# Patient Record
Sex: FEMALE | Employment: OTHER | URBAN - METROPOLITAN AREA
[De-identification: names, ages, dates, MRNs, and addresses within clinical notes are randomized per-mention and may not be internally consistent; named-entity substitution may affect disease eponyms.]

---

## 2018-04-05 ENCOUNTER — APPOINTMENT (OUTPATIENT)
Dept: GENERAL RADIOLOGY | Age: 73
DRG: 189 | End: 2018-04-05
Attending: EMERGENCY MEDICINE
Payer: MEDICARE

## 2018-04-05 ENCOUNTER — HOSPITAL ENCOUNTER (INPATIENT)
Age: 73
LOS: 2 days | Discharge: HOME | DRG: 189 | End: 2018-04-07
Attending: EMERGENCY MEDICINE | Admitting: INTERNAL MEDICINE
Payer: MEDICARE

## 2018-04-05 ENCOUNTER — APPOINTMENT (OUTPATIENT)
Dept: CT IMAGING | Age: 73
DRG: 189 | End: 2018-04-05
Attending: INTERNAL MEDICINE
Payer: MEDICARE

## 2018-04-05 ENCOUNTER — APPOINTMENT (OUTPATIENT)
Dept: ULTRASOUND IMAGING | Age: 73
DRG: 189 | End: 2018-04-05
Attending: INTERNAL MEDICINE
Payer: MEDICARE

## 2018-04-05 ENCOUNTER — APPOINTMENT (OUTPATIENT)
Dept: GENERAL RADIOLOGY | Age: 73
DRG: 189 | End: 2018-04-05
Attending: RADIOLOGY
Payer: MEDICARE

## 2018-04-05 DIAGNOSIS — D63.8 ANEMIA OF CHRONIC DISEASE: ICD-10-CM

## 2018-04-05 DIAGNOSIS — J96.01 ACUTE RESPIRATORY FAILURE WITH HYPOXIA (HCC): Primary | ICD-10-CM

## 2018-04-05 DIAGNOSIS — J98.11 COMPRESSION ATELECTASIS: ICD-10-CM

## 2018-04-05 DIAGNOSIS — J18.9 COMMUNITY ACQUIRED PNEUMONIA OF LEFT LUNG, UNSPECIFIED PART OF LUNG: ICD-10-CM

## 2018-04-05 DIAGNOSIS — N17.9 AKI (ACUTE KIDNEY INJURY) (HCC): ICD-10-CM

## 2018-04-05 DIAGNOSIS — J90 PLEURAL EFFUSION, LEFT: ICD-10-CM

## 2018-04-05 DIAGNOSIS — D64.9 SYMPTOMATIC ANEMIA: ICD-10-CM

## 2018-04-05 DIAGNOSIS — R91.8 MASS OF LEFT LUNG: ICD-10-CM

## 2018-04-05 LAB
ABO + RH BLD: NORMAL
ALBUMIN FLD-MCNC: 2.1 G/DL
ALBUMIN SERPL-MCNC: 2.8 G/DL (ref 3.5–5)
ALBUMIN/GLOB SERPL: 0.7 {RATIO} (ref 1.1–2.2)
ALP SERPL-CCNC: 89 U/L (ref 45–117)
ALT SERPL-CCNC: 16 U/L (ref 12–78)
AMORPH CRY URNS QL MICRO: ABNORMAL
ANION GAP SERPL CALC-SCNC: 12 MMOL/L (ref 5–15)
APPEARANCE FLD: ABNORMAL
APPEARANCE UR: ABNORMAL
APTT PPP: 27.8 SEC (ref 22.1–32)
AST SERPL-CCNC: 16 U/L (ref 15–37)
ATRIAL RATE: 70 BPM
BACTERIA URNS QL MICRO: NEGATIVE /HPF
BASOPHILS # BLD: 0.1 K/UL (ref 0–0.1)
BASOPHILS NFR BLD: 1 % (ref 0–1)
BILIRUB SERPL-MCNC: 0.2 MG/DL (ref 0.2–1)
BILIRUB UR QL: NEGATIVE
BLOOD GROUP ANTIBODIES SERPL: NORMAL
BNP SERPL-MCNC: 8324 PG/ML (ref 0–125)
BODY FLD TYPE: NORMAL
BUN SERPL-MCNC: 95 MG/DL (ref 6–20)
BUN/CREAT SERPL: 12 (ref 12–20)
CALCIUM SERPL-MCNC: 11.6 MG/DL (ref 8.5–10.1)
CALCULATED P AXIS, ECG09: 65 DEGREES
CALCULATED R AXIS, ECG10: 45 DEGREES
CALCULATED T AXIS, ECG11: 35 DEGREES
CHLORIDE SERPL-SCNC: 104 MMOL/L (ref 97–108)
CK MB CFR SERPL CALC: 7.4 % (ref 0–2.5)
CK MB SERPL-MCNC: 5.9 NG/ML (ref 5–25)
CK SERPL-CCNC: 80 U/L (ref 26–192)
CO2 SERPL-SCNC: 23 MMOL/L (ref 21–32)
COLOR FLD: COLORLESS
COLOR UR: ABNORMAL
CREAT SERPL-MCNC: 7.83 MG/DL (ref 0.55–1.02)
CREAT UR-MCNC: 54.5 MG/DL
DIAGNOSIS, 93000: NORMAL
DIFFERENTIAL METHOD BLD: ABNORMAL
EOSINOPHIL # BLD: 0.3 K/UL (ref 0–0.4)
EOSINOPHIL NFR BLD: 3 % (ref 0–7)
EOSINOPHIL NFR FLD MANUAL: 2 %
EPITH CASTS URNS QL MICRO: ABNORMAL /LPF
ERYTHROCYTE [DISTWIDTH] IN BLOOD BY AUTOMATED COUNT: 14.9 % (ref 11.5–14.5)
FLUAV AG NPH QL IA: NEGATIVE
FLUBV AG NOSE QL IA: NEGATIVE
GLOBULIN SER CALC-MCNC: 4.3 G/DL (ref 2–4)
GLUCOSE FLD-MCNC: 100 MG/DL
GLUCOSE SERPL-MCNC: 136 MG/DL (ref 65–100)
GLUCOSE UR STRIP.AUTO-MCNC: NEGATIVE MG/DL
HCT VFR BLD AUTO: 27 % (ref 35–47)
HEMOCCULT STL QL: NEGATIVE
HGB BLD-MCNC: 8.6 G/DL (ref 11.5–16)
HGB UR QL STRIP: ABNORMAL
IMM GRANULOCYTES # BLD: 0.1 K/UL (ref 0–0.04)
IMM GRANULOCYTES NFR BLD AUTO: 1 % (ref 0–0.5)
INR PPP: 1.1 (ref 0.9–1.1)
KETONES UR QL STRIP.AUTO: NEGATIVE MG/DL
LACTATE SERPL-SCNC: 1.4 MMOL/L (ref 0.4–2)
LDH FLD L TO P-CCNC: 232 U/L
LEUKOCYTE ESTERASE UR QL STRIP.AUTO: NEGATIVE
LYMPHOCYTES # BLD: 1.4 K/UL (ref 0.8–3.5)
LYMPHOCYTES NFR BLD: 17 % (ref 12–49)
LYMPHOCYTES NFR FLD: 12 %
MAGNESIUM SERPL-MCNC: 2.5 MG/DL (ref 1.6–2.4)
MCH RBC QN AUTO: 26.9 PG (ref 26–34)
MCHC RBC AUTO-ENTMCNC: 31.9 G/DL (ref 30–36.5)
MCV RBC AUTO: 84.4 FL (ref 80–99)
MONOCYTES # BLD: 1.1 K/UL (ref 0–1)
MONOCYTES NFR BLD: 13 % (ref 5–13)
MONOS+MACROS NFR FLD: 2 %
NEUTROPHILS NFR FLD: 20 %
NEUTS SEG # BLD: 5.5 K/UL (ref 1.8–8)
NEUTS SEG NFR BLD: 66 % (ref 32–75)
NITRITE UR QL STRIP.AUTO: NEGATIVE
NRBC # BLD: 0 K/UL (ref 0–0.01)
NRBC BLD-RTO: 0 PER 100 WBC
NUC CELL # FLD: 2219 /CU MM
OTHER CELL,FOTHC: 64 %
P-R INTERVAL, ECG05: 158 MS
PATH REV BLD -IMP: NORMAL
PH FLD: 7 [PH]
PH UR STRIP: 5.5 [PH] (ref 5–8)
PLATELET # BLD AUTO: 306 K/UL (ref 150–400)
PMV BLD AUTO: 10.6 FL (ref 8.9–12.9)
POTASSIUM SERPL-SCNC: 3.2 MMOL/L (ref 3.5–5.1)
PROT FLD-MCNC: 4.4 G/DL
PROT SERPL-MCNC: 7.1 G/DL (ref 6.4–8.2)
PROT UR STRIP-MCNC: 100 MG/DL
PROTHROMBIN TIME: 11.3 SEC (ref 9–11.1)
Q-T INTERVAL, ECG07: 388 MS
QRS DURATION, ECG06: 90 MS
QTC CALCULATION (BEZET), ECG08: 419 MS
RBC # BLD AUTO: 3.2 M/UL (ref 3.8–5.2)
RBC # FLD: >100 /CU MM
RBC #/AREA URNS HPF: ABNORMAL /HPF (ref 0–5)
SODIUM SERPL-SCNC: 139 MMOL/L (ref 136–145)
SP GR UR REFRACTOMETRY: 1.01 (ref 1–1.03)
SPECIMEN EXP DATE BLD: NORMAL
SPECIMEN SOURCE FLD: ABNORMAL
SPECIMEN SOURCE FLD: NORMAL
THERAPEUTIC RANGE,PTTT: NORMAL SECS (ref 58–77)
TRIGL FLD-MCNC: 48 MG/DL
TROPONIN I SERPL-MCNC: <0.04 NG/ML
UA: UC IF INDICATED,UAUC: ABNORMAL
UROBILINOGEN UR QL STRIP.AUTO: 0.2 EU/DL (ref 0.2–1)
VENTRICULAR RATE, ECG03: 70 BPM
WBC # BLD AUTO: 8.4 K/UL (ref 3.6–11)
WBC URNS QL MICRO: ABNORMAL /HPF (ref 0–4)

## 2018-04-05 PROCEDURE — 87040 BLOOD CULTURE FOR BACTERIA: CPT | Performed by: EMERGENCY MEDICINE

## 2018-04-05 PROCEDURE — 88342 IMHCHEM/IMCYTCHM 1ST ANTB: CPT | Performed by: INTERNAL MEDICINE

## 2018-04-05 PROCEDURE — 93005 ELECTROCARDIOGRAM TRACING: CPT

## 2018-04-05 PROCEDURE — 89050 BODY FLUID CELL COUNT: CPT | Performed by: INTERNAL MEDICINE

## 2018-04-05 PROCEDURE — 84157 ASSAY OF PROTEIN OTHER: CPT | Performed by: INTERNAL MEDICINE

## 2018-04-05 PROCEDURE — 81001 URINALYSIS AUTO W/SCOPE: CPT | Performed by: EMERGENCY MEDICINE

## 2018-04-05 PROCEDURE — 85730 THROMBOPLASTIN TIME PARTIAL: CPT | Performed by: EMERGENCY MEDICINE

## 2018-04-05 PROCEDURE — 65660000000 HC RM CCU STEPDOWN

## 2018-04-05 PROCEDURE — 76770 US EXAM ABDO BACK WALL COMP: CPT

## 2018-04-05 PROCEDURE — 74011250637 HC RX REV CODE- 250/637: Performed by: INTERNAL MEDICINE

## 2018-04-05 PROCEDURE — 99285 EMERGENCY DEPT VISIT HI MDM: CPT

## 2018-04-05 PROCEDURE — 77030029684 HC NEB SM VOL KT MONA -A

## 2018-04-05 PROCEDURE — 87205 SMEAR GRAM STAIN: CPT | Performed by: INTERNAL MEDICINE

## 2018-04-05 PROCEDURE — 87804 INFLUENZA ASSAY W/OPTIC: CPT | Performed by: EMERGENCY MEDICINE

## 2018-04-05 PROCEDURE — 83880 ASSAY OF NATRIURETIC PEPTIDE: CPT | Performed by: EMERGENCY MEDICINE

## 2018-04-05 PROCEDURE — 88112 CYTOPATH CELL ENHANCE TECH: CPT | Performed by: INTERNAL MEDICINE

## 2018-04-05 PROCEDURE — 88185 FLOWCYTOMETRY/TC ADD-ON: CPT | Performed by: INTERNAL MEDICINE

## 2018-04-05 PROCEDURE — 74011250636 HC RX REV CODE- 250/636: Performed by: EMERGENCY MEDICINE

## 2018-04-05 PROCEDURE — 88341 IMHCHEM/IMCYTCHM EA ADD ANTB: CPT | Performed by: INTERNAL MEDICINE

## 2018-04-05 PROCEDURE — 96375 TX/PRO/DX INJ NEW DRUG ADDON: CPT

## 2018-04-05 PROCEDURE — 96365 THER/PROPH/DIAG IV INF INIT: CPT

## 2018-04-05 PROCEDURE — 85610 PROTHROMBIN TIME: CPT | Performed by: EMERGENCY MEDICINE

## 2018-04-05 PROCEDURE — 71045 X-RAY EXAM CHEST 1 VIEW: CPT

## 2018-04-05 PROCEDURE — 74011250636 HC RX REV CODE- 250/636: Performed by: INTERNAL MEDICINE

## 2018-04-05 PROCEDURE — 71250 CT THORAX DX C-: CPT

## 2018-04-05 PROCEDURE — 83615 LACTATE (LD) (LDH) ENZYME: CPT | Performed by: INTERNAL MEDICINE

## 2018-04-05 PROCEDURE — 83605 ASSAY OF LACTIC ACID: CPT | Performed by: EMERGENCY MEDICINE

## 2018-04-05 PROCEDURE — 83986 ASSAY PH BODY FLUID NOS: CPT | Performed by: INTERNAL MEDICINE

## 2018-04-05 PROCEDURE — 82550 ASSAY OF CK (CPK): CPT | Performed by: EMERGENCY MEDICINE

## 2018-04-05 PROCEDURE — 80053 COMPREHEN METABOLIC PANEL: CPT | Performed by: EMERGENCY MEDICINE

## 2018-04-05 PROCEDURE — 82272 OCCULT BLD FECES 1-3 TESTS: CPT | Performed by: EMERGENCY MEDICINE

## 2018-04-05 PROCEDURE — 82945 GLUCOSE OTHER FLUID: CPT | Performed by: INTERNAL MEDICINE

## 2018-04-05 PROCEDURE — 36415 COLL VENOUS BLD VENIPUNCTURE: CPT | Performed by: EMERGENCY MEDICINE

## 2018-04-05 PROCEDURE — G8978 MOBILITY CURRENT STATUS: HCPCS

## 2018-04-05 PROCEDURE — 88305 TISSUE EXAM BY PATHOLOGIST: CPT | Performed by: INTERNAL MEDICINE

## 2018-04-05 PROCEDURE — 94761 N-INVAS EAR/PLS OXIMETRY MLT: CPT

## 2018-04-05 PROCEDURE — 74011000258 HC RX REV CODE- 258: Performed by: EMERGENCY MEDICINE

## 2018-04-05 PROCEDURE — 85025 COMPLETE CBC W/AUTO DIFF WBC: CPT | Performed by: EMERGENCY MEDICINE

## 2018-04-05 PROCEDURE — 86900 BLOOD TYPING SEROLOGIC ABO: CPT | Performed by: EMERGENCY MEDICINE

## 2018-04-05 PROCEDURE — 84484 ASSAY OF TROPONIN QUANT: CPT | Performed by: EMERGENCY MEDICINE

## 2018-04-05 PROCEDURE — 74011000250 HC RX REV CODE- 250: Performed by: EMERGENCY MEDICINE

## 2018-04-05 PROCEDURE — 97530 THERAPEUTIC ACTIVITIES: CPT

## 2018-04-05 PROCEDURE — G8979 MOBILITY GOAL STATUS: HCPCS

## 2018-04-05 PROCEDURE — 0W9B3ZX DRAINAGE OF LEFT PLEURAL CAVITY, PERCUTANEOUS APPROACH, DIAGNOSTIC: ICD-10-PCS | Performed by: RADIOLOGY

## 2018-04-05 PROCEDURE — 82570 ASSAY OF URINE CREATININE: CPT | Performed by: INTERNAL MEDICINE

## 2018-04-05 PROCEDURE — 74011000250 HC RX REV CODE- 250: Performed by: RADIOLOGY

## 2018-04-05 PROCEDURE — 83735 ASSAY OF MAGNESIUM: CPT | Performed by: EMERGENCY MEDICINE

## 2018-04-05 PROCEDURE — 84478 ASSAY OF TRIGLYCERIDES: CPT | Performed by: INTERNAL MEDICINE

## 2018-04-05 PROCEDURE — 88184 FLOWCYTOMETRY/ TC 1 MARKER: CPT | Performed by: INTERNAL MEDICINE

## 2018-04-05 PROCEDURE — 32555 ASPIRATE PLEURA W/ IMAGING: CPT

## 2018-04-05 PROCEDURE — 97161 PT EVAL LOW COMPLEX 20 MIN: CPT

## 2018-04-05 PROCEDURE — 96367 TX/PROPH/DG ADDL SEQ IV INF: CPT

## 2018-04-05 PROCEDURE — 94640 AIRWAY INHALATION TREATMENT: CPT

## 2018-04-05 PROCEDURE — 82042 OTHER SOURCE ALBUMIN QUAN EA: CPT | Performed by: INTERNAL MEDICINE

## 2018-04-05 RX ORDER — ANASTROZOLE 1 MG/1
1 TABLET ORAL DAILY
Status: DISCONTINUED | OUTPATIENT
Start: 2018-04-05 | End: 2018-04-07 | Stop reason: HOSPADM

## 2018-04-05 RX ORDER — SODIUM CHLORIDE 0.9 % (FLUSH) 0.9 %
5-10 SYRINGE (ML) INJECTION EVERY 8 HOURS
Status: DISCONTINUED | OUTPATIENT
Start: 2018-04-05 | End: 2018-04-07 | Stop reason: HOSPADM

## 2018-04-05 RX ORDER — IPRATROPIUM BROMIDE AND ALBUTEROL SULFATE 2.5; .5 MG/3ML; MG/3ML
3 SOLUTION RESPIRATORY (INHALATION)
Status: COMPLETED | OUTPATIENT
Start: 2018-04-05 | End: 2018-04-05

## 2018-04-05 RX ORDER — FACIAL-BODY WIPES
10 EACH TOPICAL DAILY PRN
Status: DISCONTINUED | OUTPATIENT
Start: 2018-04-05 | End: 2018-04-07 | Stop reason: HOSPADM

## 2018-04-05 RX ORDER — SODIUM CHLORIDE 0.9 % (FLUSH) 0.9 %
5-10 SYRINGE (ML) INJECTION AS NEEDED
Status: DISCONTINUED | OUTPATIENT
Start: 2018-04-05 | End: 2018-04-07 | Stop reason: HOSPADM

## 2018-04-05 RX ORDER — HYDRALAZINE HYDROCHLORIDE 20 MG/ML
20 INJECTION INTRAMUSCULAR; INTRAVENOUS
Status: DISCONTINUED | OUTPATIENT
Start: 2018-04-05 | End: 2018-04-07 | Stop reason: HOSPADM

## 2018-04-05 RX ORDER — OXYCODONE AND ACETAMINOPHEN 5; 325 MG/1; MG/1
1 TABLET ORAL
Status: DISCONTINUED | OUTPATIENT
Start: 2018-04-05 | End: 2018-04-07 | Stop reason: HOSPADM

## 2018-04-05 RX ORDER — ACETAMINOPHEN 325 MG/1
650 TABLET ORAL
Status: DISCONTINUED | OUTPATIENT
Start: 2018-04-05 | End: 2018-04-07 | Stop reason: HOSPADM

## 2018-04-05 RX ORDER — ENOXAPARIN SODIUM 100 MG/ML
40 INJECTION SUBCUTANEOUS EVERY 24 HOURS
Status: DISCONTINUED | OUTPATIENT
Start: 2018-04-05 | End: 2018-04-05

## 2018-04-05 RX ORDER — LIDOCAINE HYDROCHLORIDE 10 MG/ML
10 INJECTION INFILTRATION; PERINEURAL
Status: COMPLETED | OUTPATIENT
Start: 2018-04-05 | End: 2018-04-05

## 2018-04-05 RX ORDER — SODIUM CHLORIDE 9 MG/ML
75 INJECTION, SOLUTION INTRAVENOUS CONTINUOUS
Status: DISCONTINUED | OUTPATIENT
Start: 2018-04-05 | End: 2018-04-07 | Stop reason: HOSPADM

## 2018-04-05 RX ORDER — HEPARIN SODIUM 5000 [USP'U]/ML
5000 INJECTION, SOLUTION INTRAVENOUS; SUBCUTANEOUS EVERY 12 HOURS
Status: DISCONTINUED | OUTPATIENT
Start: 2018-04-05 | End: 2018-04-07

## 2018-04-05 RX ORDER — NALOXONE HYDROCHLORIDE 0.4 MG/ML
0.4 INJECTION, SOLUTION INTRAMUSCULAR; INTRAVENOUS; SUBCUTANEOUS AS NEEDED
Status: DISCONTINUED | OUTPATIENT
Start: 2018-04-05 | End: 2018-04-07 | Stop reason: HOSPADM

## 2018-04-05 RX ORDER — POTASSIUM CHLORIDE 750 MG/1
20 TABLET, FILM COATED, EXTENDED RELEASE ORAL
Status: COMPLETED | OUTPATIENT
Start: 2018-04-05 | End: 2018-04-05

## 2018-04-05 RX ORDER — ONDANSETRON 2 MG/ML
4 INJECTION INTRAMUSCULAR; INTRAVENOUS
Status: DISCONTINUED | OUTPATIENT
Start: 2018-04-05 | End: 2018-04-07 | Stop reason: HOSPADM

## 2018-04-05 RX ADMIN — CEFTRIAXONE SODIUM 2 G: 2 INJECTION, POWDER, FOR SOLUTION INTRAMUSCULAR; INTRAVENOUS at 02:41

## 2018-04-05 RX ADMIN — FAMOTIDINE 20 MG: 10 INJECTION INTRAVENOUS at 03:14

## 2018-04-05 RX ADMIN — Medication 10 ML: at 21:15

## 2018-04-05 RX ADMIN — SODIUM CHLORIDE 75 ML/HR: 900 INJECTION, SOLUTION INTRAVENOUS at 06:45

## 2018-04-05 RX ADMIN — AZITHROMYCIN MONOHYDRATE 500 MG: 500 INJECTION, POWDER, LYOPHILIZED, FOR SOLUTION INTRAVENOUS at 03:15

## 2018-04-05 RX ADMIN — ANASTROZOLE 1 MG: 1 TABLET, COATED ORAL at 10:00

## 2018-04-05 RX ADMIN — Medication 10 ML: at 17:57

## 2018-04-05 RX ADMIN — HEPARIN SODIUM 5000 UNITS: 5000 INJECTION, SOLUTION INTRAVENOUS; SUBCUTANEOUS at 17:53

## 2018-04-05 RX ADMIN — Medication 10 ML: at 06:46

## 2018-04-05 RX ADMIN — POTASSIUM CHLORIDE 20 MEQ: 750 TABLET, EXTENDED RELEASE ORAL at 09:41

## 2018-04-05 RX ADMIN — Medication 10 ML: at 02:14

## 2018-04-05 RX ADMIN — LIDOCAINE HYDROCHLORIDE 10 ML: 10 INJECTION, SOLUTION INFILTRATION; PERINEURAL at 12:00

## 2018-04-05 RX ADMIN — IPRATROPIUM BROMIDE AND ALBUTEROL SULFATE 3 ML: .5; 3 SOLUTION RESPIRATORY (INHALATION) at 02:14

## 2018-04-05 RX ADMIN — Medication 10 ML: at 21:14

## 2018-04-05 NOTE — ED PROVIDER NOTES
EMERGENCY DEPARTMENT HISTORY AND PHYSICAL EXAM      Date: 4/5/2018  Patient Name: Colette Santiago    History of Presenting Illness     Chief Complaint   Patient presents with    Shortness of Breath     Per EMS SOB x2 weeks. Denies home oxygen. Ambulatory from EMS to ED stretcher.  Melena     Dark colored stool per EMS x2 weeks. History Provided By: Patient, Patient's  and EMS    HPI: Colette Santiago, 68 y.o. female with PMHx significant for HTN / breast CA, presents via EMS accompanied by  to the ED with cc of persistent SOB x 6 weeks that is exacerbated with activity/ambulation and has become progressively worse over the last 2 weeks. Pt complains of associated mild bilateral leg swelling, persistent dry non-productive cough x 3 weeks, gradually worsening fatigue, and persistent dark colored stool x 2 weeks. She denies any history of similar symptoms. Pt denies taking any blood thinning medication. She notes that she has had a colonoscopy within the last couple years that was normal. Pt endorses taking prescription Advil over the last couple weeks for a pulled muscle in her buttocks. She specifically denies fever, chills, or CP. PCP: Sergio Blum MD    There are no other complaints, changes, or physical findings at this time.     Current Facility-Administered Medications   Medication Dose Route Frequency Provider Last Rate Last Dose    sodium chloride (NS) flush 5-10 mL  5-10 mL IntraVENous Q8H Anita Salas MD   10 mL at 04/05/18 0214    sodium chloride (NS) flush 5-10 mL  5-10 mL IntraVENous PRN Anita Salas MD        cefTRIAXone (ROCEPHIN) 2 g in 0.9% sodium chloride (MBP/ADV) 50 mL  2 g IntraVENous Q24H Anita Salas MD   Stopped at 04/05/18 0311    azithromycin (ZITHROMAX) 500 mg in 0.9% sodium chloride (MBP/ADV) 250 mL  500 mg IntraVENous Q24H Anita Salas MD   Stopped at 04/05/18 0131       Past History     Past Medical History:  Past Medical History:   Diagnosis Date    Breast cancer ([facility] [address]     Hypertension        Past Surgical History:  Past Surgical History:   Procedure Laterality Date    BREAST SURGERY PROCEDURE UNLISTED      HX GYN       x3    HX LYMPHADENECTOMY      \"they tested it but it came back negative. \"       Family History:  Family History   Problem Relation Age of Onset    Family history unknown: Yes       Social History:  Social History   Substance Use Topics    Smoking status: Never Smoker    Smokeless tobacco: Never Used    Alcohol use Yes      Comment: occ       Allergies:  No Known Allergies      Review of Systems   Review of Systems   Constitutional: Positive for fatigue. Negative for chills and fever. HENT: Negative. Negative for congestion, facial swelling, rhinorrhea, sore throat, trouble swallowing and voice change. Eyes: Negative. Respiratory: Positive for cough and shortness of breath. Negative for apnea, chest tightness and wheezing. Cardiovascular: Positive for leg swelling. Negative for chest pain and palpitations. Gastrointestinal: Negative for abdominal distention, abdominal pain, blood in stool, constipation, diarrhea, nausea and vomiting. Positive for melena   Endocrine: Negative. Negative for cold intolerance, heat intolerance and polyuria. Genitourinary: Negative. Negative for difficulty urinating, dysuria, flank pain, frequency, hematuria and urgency. Musculoskeletal: Negative. Negative for arthralgias, back pain, myalgias, neck pain and neck stiffness. Skin: Negative. Negative for color change and rash. Neurological: Negative. Negative for dizziness, syncope, facial asymmetry, speech difficulty, weakness, light-headedness, numbness and headaches. Hematological: Negative. Does not bruise/bleed easily. Psychiatric/Behavioral: Negative. Negative for confusion and self-injury. The patient is not nervous/anxious. Physical Exam   Physical Exam   Constitutional: She is oriented to person, place, and time. She appears well-developed and well-nourished. No distress. HENT:   Head: Normocephalic and atraumatic. Mouth/Throat: Oropharynx is clear and moist. No oropharyngeal exudate. Eyes: Conjunctivae and EOM are normal. Pupils are equal, round, and reactive to light. Neck: Normal range of motion. Cardiovascular: Normal rate, regular rhythm and normal heart sounds. Exam reveals no gallop and no friction rub. No murmur heard. Pulmonary/Chest: Effort normal. No respiratory distress. She has no rales. She exhibits no tenderness. On 2L O2 via NC  Diffuse bilateral wheezes  Crackles in bilateral bases   Abdominal: Soft. Bowel sounds are normal. She exhibits no distension and no mass. There is no tenderness. There is no rebound and no guarding. Genitourinary:   Genitourinary Comments: Rectal Exam Findings: no external hemorrhoids. No hard stool. Black tarry stool collected for heme occult testing. Musculoskeletal: Normal range of motion. She exhibits no tenderness or deformity. +1 bilateral peripheral edema   Neurological: She is alert and oriented to person, place, and time. She displays normal reflexes. No cranial nerve deficit. She exhibits normal muscle tone. Coordination normal.   Skin: Skin is warm. No rash noted. She is not diaphoretic. Psychiatric: She has a normal mood and affect. Nursing note and vitals reviewed.         Diagnostic Study Results     Labs -     Recent Results (from the past 12 hour(s))   CBC WITH AUTOMATED DIFF    Collection Time: 04/05/18  1:51 AM   Result Value Ref Range    WBC 8.4 3.6 - 11.0 K/uL    RBC 3.20 (L) 3.80 - 5.20 M/uL    HGB 8.6 (L) 11.5 - 16.0 g/dL    HCT 27.0 (L) 35.0 - 47.0 %    MCV 84.4 80.0 - 99.0 FL    MCH 26.9 26.0 - 34.0 PG    MCHC 31.9 30.0 - 36.5 g/dL    RDW 14.9 (H) 11.5 - 14.5 %    PLATELET 240 899 - 618 K/uL    MPV 10.6 8.9 - 12.9 FL    NRBC 0.0 0  WBC    ABSOLUTE NRBC 0.00 0.00 - 0.01 K/uL    NEUTROPHILS 66 32 - 75 %    LYMPHOCYTES 17 12 - 49 %    MONOCYTES 13 5 - 13 %    EOSINOPHILS 3 0 - 7 %    BASOPHILS 1 0 - 1 %    IMMATURE GRANULOCYTES 1 (H) 0.0 - 0.5 %    ABS. NEUTROPHILS 5.5 1.8 - 8.0 K/UL    ABS. LYMPHOCYTES 1.4 0.8 - 3.5 K/UL    ABS. MONOCYTES 1.1 (H) 0.0 - 1.0 K/UL    ABS. EOSINOPHILS 0.3 0.0 - 0.4 K/UL    ABS. BASOPHILS 0.1 0.0 - 0.1 K/UL    ABS. IMM. GRANS. 0.1 (H) 0.00 - 0.04 K/UL    DF AUTOMATED     CK W/ CKMB & INDEX    Collection Time: 04/05/18  1:51 AM   Result Value Ref Range    CK 80 26 - 192 U/L    CK - MB 5.9 (H) <3.6 NG/ML    CK-MB Index 7.4 (H) 0 - 2.5     METABOLIC PANEL, COMPREHENSIVE    Collection Time: 04/05/18  1:51 AM   Result Value Ref Range    Sodium 139 136 - 145 mmol/L    Potassium 3.2 (L) 3.5 - 5.1 mmol/L    Chloride 104 97 - 108 mmol/L    CO2 23 21 - 32 mmol/L    Anion gap 12 5 - 15 mmol/L    Glucose 136 (H) 65 - 100 mg/dL    BUN 95 (H) 6 - 20 MG/DL    Creatinine 7.83 (H) 0.55 - 1.02 MG/DL    BUN/Creatinine ratio 12 12 - 20      GFR est AA 6 (L) >60 ml/min/1.73m2    GFR est non-AA 5 (L) >60 ml/min/1.73m2    Calcium 11.6 (H) 8.5 - 10.1 MG/DL    Bilirubin, total 0.2 0.2 - 1.0 MG/DL    ALT (SGPT) 16 12 - 78 U/L    AST (SGOT) 16 15 - 37 U/L    Alk.  phosphatase 89 45 - 117 U/L    Protein, total 7.1 6.4 - 8.2 g/dL    Albumin 2.8 (L) 3.5 - 5.0 g/dL    Globulin 4.3 (H) 2.0 - 4.0 g/dL    A-G Ratio 0.7 (L) 1.1 - 2.2     MAGNESIUM    Collection Time: 04/05/18  1:51 AM   Result Value Ref Range    Magnesium 2.5 (H) 1.6 - 2.4 mg/dL   NT-PRO BNP    Collection Time: 04/05/18  1:51 AM   Result Value Ref Range    NT pro-BNP 8324 (H) 0 - 125 PG/ML   PROTHROMBIN TIME + INR    Collection Time: 04/05/18  1:51 AM   Result Value Ref Range    INR 1.1 0.9 - 1.1      Prothrombin time 11.3 (H) 9.0 - 11.1 sec   PTT    Collection Time: 04/05/18  1:51 AM   Result Value Ref Range    aPTT 27.8 22.1 - 32.0 sec    aPTT, therapeutic range     58.0 - 77.0 SECS   TROPONIN I    Collection Time: 04/05/18  1:51 AM   Result Value Ref Range    Troponin-I, Qt. <0.04 <0.05 ng/mL   OCCULT BLOOD, STOOL    Collection Time: 04/05/18  2:12 AM   Result Value Ref Range    Occult blood, stool NEGATIVE  NEG     TYPE & SCREEN    Collection Time: 04/05/18  2:12 AM   Result Value Ref Range    Crossmatch Expiration 04/08/2018     ABO/Rh(D) Clydell Licea POSITIVE     Antibody screen NEG    INFLUENZA A & B AG (RAPID TEST)    Collection Time: 04/05/18  2:12 AM   Result Value Ref Range    Influenza A Antigen NEGATIVE  NEG      Influenza B Antigen NEGATIVE  NEG     LACTIC ACID    Collection Time: 04/05/18  2:33 AM   Result Value Ref Range    Lactic acid 1.4 0.4 - 2.0 MMOL/L       Radiologic Studies -   CT CHEST WO CONT   Final Result      XR CHEST PORT   Final Result        CT Results  (Last 48 hours)               04/05/18 0408  CT CHEST WO CONT Final result    Impression:  IMPRESSION:       1. Large soft tissue mass anterior left upper lobe/left mediastinum, with   extension into the deep left breast and associated destructive change of the   anterior first and second ribs. Additional pleural-based lesion posterior left   lower lobe, with left lung volume loss and consolidation and large pleural   effusion. 2. Several osseous metastases largest of which is in the left posterior seventh   rib. 3. Postsurgical changes right breast. Partly visualized asymmetry deep left   breast which may be better evaluated with mammography as clinically indicated. 4. Nonspecific 1.1 cm splenic hypodensity. Narrative:  INDICATION: assess for left lung mass or effusion       COMPARISON: Chest radiograph earlier today       TECHNIQUE:  Noncontrast 5 mm axial images were obtained through the chest. CT   dose reduction was achieved through use of a standardized protocol tailored for   this examination and automatic exposure control for dose modulation. FINDINGS:       THYROID: Unremarkable. MEDIASTINUM/ISABELL: No mass or lymphadenopathy. Small to moderate hiatal hernia.    HEART/PERICARDIUM: Slightly enlarged. Small pericardial fluid. LUNGS/PLEURA: Right basilar atelectasis. Diminished left lung volume with large   pleural effusion. Destructive soft tissue mass anterior left upper lobe   extending into the mediastinum as well as through the ribs in the anterior chest   wall. Mass measures 6.9 x 5.9 x 5.6 cm. Suspect separate pleural mass left   posterior lung base measures 3.0 x 1.8 cm. INCIDENTALLY IMAGED UPPER ABDOMEN: Small ill-defined 1.1 cm splenic hypodensity. BONES: Moderate compression deformity of T12, age indeterminate. Lytic expansile   mass involving the left posterior seventh rib measures 1.7 x 2.9 cm, with   several other subtle lytic lesions involving the bony thorax. ADDITIONAL COMMENTS:  Postsurgical changes partly visualized right breast.    Partly visualized asymmetric density deep left lateral breast.               CXR Results  (Last 48 hours)               04/05/18 0208  XR CHEST PORT Final result    Impression:  IMPRESSION:   Left basilar consolidation and effusion. Narrative:  INDICATION:   SOB       EXAM:  AP CHEST RADIOGRAPH       COMPARISON: None       FINDINGS:       AP portable view of the chest demonstrates cardiomegaly. The lungs are   adequately expanded. There is left basilar consolidation and associated   effusion/volume loss. The osseous structures are unremarkable. Medical Decision Making   I am the first provider for this patient. I reviewed the vital signs, available nursing notes, past medical history, past surgical history, family history and social history. Vital Signs-Reviewed the patient's vital signs.   Patient Vitals for the past 12 hrs:   Temp Pulse Resp BP SpO2   04/05/18 0459 98.4 °F (36.9 °C) 67 17 116/51 95 %   04/05/18 0422 98.2 °F (36.8 °C) 73 16 129/64 97 %   04/05/18 0248 98.4 °F (36.9 °C) 72 15 134/61 98 %   04/05/18 0153 - - - - 90 %   04/05/18 0139 - - - - 94 %   04/05/18 0138 98.6 °F (37 °C) 72 23 162/76 (!) 89 % Pulse Oximetry Analysis - 90% on RA (95% on 2L O2 via NC)    EKG interpretation: (Preliminary)  01:42  Rhythm: normal sinus rhythm; and regular . Rate (approx.): 70; Axis: normal; GA interval: normal; QRS interval: normal ; ST/T wave: normal; Other findings: normal.  Written by Mariana Colvin ED Scribe, as dictated by Alex Hamilton MD.    Records Reviewed: Nursing Notes, Old Medical Records, Previous electrocardiograms, Ambulance Run Sheet, Previous Radiology Studies and Previous Laboratory Studies    Provider Notes (Medical Decision Making):   Elderly female presenting with SOB worse with exertion. Will obtain EKG, cardiac enzymes, and chest X-ray. Will check stool sample and obtain type and screen. Will provide breathing treatment and reassess. ED Course:   Initial assessment performed. The patients presenting problems have been discussed, and they are in agreement with the care plan formulated and outlined with them. I have encouraged them to ask questions as they arise throughout their visit. Procedure Note - Rectal Exam:   1:59 AM  Performed by: Alex Hamilton MD  Chaperoned by: Bonny Zelaya RN  Rectal exam performed. Dark brown stool was collected. Stool was collected and sent to the lab for Hemoccult testing. Other findings: no external hemorrhoids. No hard stool. The procedure took 1-15 minutes, and pt tolerated well. Progress Note:  2:22 AM  Chest X-ray left basilar consolidation and effusion will treat for community acquired PNA with Ceftriaxone and Azithromycin and obtain blood cultures. Will also obtain CT chest and reassess. Pt and family updated on results, they express their understanding and agree with the current plan of care. Written by Ning Kyle, ED Scribe, as dictated by Alex Hamilton MD.    CONSULT NOTE:   3:25 Mayela Mendieta MD spoke with Matthew Reyes MD,   Specialty: Hospitalist  Discussed pt's hx, disposition, and available diagnostic and imaging results. Reviewed care plans. Consultant will evaluate pt for admission. Written by SHIVAM Celesteibphilip, as dictated by Kayleigh Reyna MD.    Critical Care Time:   0    Disposition:  5:04 AM  Patient is being admitted to the hospital by Dr. Keller Cranker. The results of their tests and reasons for their admission have been discussed with them and/or available family. They convey agreement and understanding for the need to be admitted and for their admission diagnosis. Consultation has been made with the inpatient physician specialist for hospitalization. Written by SHIVAM Celeste, as dictated by Kayleigh Reyna MD.    PLAN: Admit to Hospitalist    Diagnosis     Clinical Impression:   1. Acute respiratory failure with hypoxia ([address]    2. ELMER (acute kidney injury) ([address]    3. Community acquired pneumonia of left lung, unspecified part of lung    4. Symptomatic anemia    5. Pleural effusion, left    6. Compression atelectasis    7. Anemia of chronic disease    8. Mass of left lung        Attestations: This note is prepared by [place], acting as Scribe for Kayleigh Reyna MD.    The scribe's documentation has been prepared under my direction and personally reviewed by me in its entirety. I confirm that the note above accurately reflects all work, treatment, procedures, and medical decision making performed by me. Kayleigh Reyna MD        This note will not be viewable in [address]

## 2018-04-05 NOTE — ED NOTES
Pt continues to rest in bed at this time with eyes closed. Hospitalist seeing pt at this time.   Family went home and will return in a hour or so

## 2018-04-05 NOTE — CDMP QUERY
Please give the clinical significance of the following lab data. CT scan chest --Large soft tissue mass anterior left upper lobe/left mediastinum, with extension into the deep left breast associated destructive change of the anterior first and second ribs. Please clarify and document your clinical opinion in the progress notes and discharge summary including the definitive and/or presumptive diagnosis, (suspected or probable), related to the above clinical findings. Please include clinical findings supporting your diagnosis.       Thanks,  Julio Stark RN/MP

## 2018-04-05 NOTE — PROGRESS NOTES
Patient seen and examined. No change to plan as outlined in H&P.     Lung mass work up ongoing  Renal failure  Hypercalcemia  hypokalemia    Issa Hermosillo MD    Addendum  -getting Renal US now - r/o post obstructive causes  -discussed with Pulmonary - will attempt to get thoracentesis today - therapeutic  Issa Hermosillo MD

## 2018-04-05 NOTE — H&P
Hospitalist Admission Note    NAME:  Huyen Magallanes   :   1945   MRN:   [id number]     Date of admit: 2018    PCP: Shobha Armando M.D. 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 Acute kidney injury POA admit BUN/creat 95/7.83  No old labs available, normally lives in [place]  Denies prior kidney disease  Check UA and urine lytes  Nothing to suggest hypovolemia  Takes atenolol and HCTZ at home, will hold  Renal US, obstruction a real concern, ? Mass in abdomen    Anemia of chronic disease POA ? Related to chronic kidney disease  HgB 8.6  Stool heme negative  Check iron studies and ferritin    History of right breast cancer POA  s/p OR/XRT 3 years ago in [place]  No recurrence per pt report  Currently on arimidex    Overweight POA Body mass index is 29.94 kg/(m^2). Given the patient's current clinical presentation, I have a high level of concern for decompensation if discharged from the emergency department. My assessment of this patient's clinical condition and my plan of care is as noted above.     DVT prophylaxis with lovenox    Code status: full code  NOK:     History     CHIEF COMPLAINT: \"I have been SOB and weak and tired for the past 2 weeks\"    HISTORY OF PRESENT ILLNESS:  68 Y.O. WF normally lives on [address]  Returning from a prolonged vacation in [place], driving back to [address] [place] [address] in a hotel in [place] for the night  Developed acute SOB at night 'Like she was smothering\" decided she had to come to ED  Notes over past 2 -3 weeks, progressively more fatigued doing minor activities  Worsening TORRES, very winded walking even 20 feet, a definite change from her prior baseline  Cough without sputum, no chest pain, positive orthopnea  No fevers, chills, no definite weight loss, may a few lbs in the past week  Frequent nausea, no recent vomiting  Did have nausea and vomiting and diarrhea x 1 day several weeks ago, lasted only 1 day  Says she has been urinating \"a lot\"    ED sats 89% room air  CXR with opacified left hemithorax, ER treated for pneumonia  No fever or leukocytosis  When I reviewed CT scan, concern for effusion and mass, I ordered a CT scan  CT scan chest          Large soft tissue mass anterior left upper lobe/left mediastinum, with extension into the deep left breast            associated destructive change of the anterior first and second ribs. Additional pleural-based lesion posterior left lower lobe, with left lung volume loss and consolidation and large pleural effusion. Several osseous metastases largest of which is in the left posterior seventh rib. Postsurgical changes right breast.    Past Medical History:   Diagnosis Date    Breast cancer ([facility] [address]     Hypertension         Past Surgical History:   Procedure Laterality Date    BREAST SURGERY PROCEDURE UNLISTED      HX GYN       x3    HX LYMPHADENECTOMY      \"they tested it but it came back negative. \"       Social History   Substance Use Topics    Smoking status: Never Smoker    Smokeless tobacco: Never Used    Alcohol use Yes      Comment: occ        Family history:  1 adopted son, 3 sons of her own, one had lymphoma  Sister  of of lung cancer age 80, she was a smoker    No Known Allergies     Prior to Admission medications    Atenolol daily  Arimidex 1 mg Po daily  HCTZ daily    to bring in pills    Review of symptoms:     POSITIVE= Bold  Negative = not bold  General:  fever, chills, sweats, generalized weakness, ? weight loss     loss of appetite   Eyes:    blurred vision, eye pain, double vision  ENT:    Coryza, sore throat, trouble swallowing  Respiratory:   cough, sputum, SOB and TORRES  Cardiology:   chest pain, orthopnea, PND, edema  Gastrointestinal:  abdominal pain , Nausea                                      vomiting and diarrhea x 1 day 2 to 3 weeks ago, constipation, melena or BRBPR  Genitourinary:  Urgency, dysuria, hematuria  Muskuloskeletal :  Joint redness, swelling or acute joint pain, myalgias     Positive low back and pelvic pain  Hematology:  easy bruising, nose or gum bleeding  Dermatological: rash, ulceration  Endocrine:   Polyuria or polydipsia, heat or hold intolerance  Neurological:  Headache, focal motor or sensory changes     Speech difficulties, memory loss  Psychological: depression, agitation      Objective:   VITALS:    Patient Vitals for the past 24 hrs:   Temp Pulse Resp BP SpO2   18 0646 98.2 °F (36.8 °C) 72 22 136/62 95 %   18 0558 98.4 °F (36.9 °C) 67 15 127/58 94 %   18 0550 - - - - 95 %   18 0459 98.4 °F (36.9 °C) 67 17 116/51 95 %   18 0422 98.2 °F (36.8 °C) 73 16 129/64 97 %   18 0248 98.4 °F (36.9 °C) 72 15 134/61 98 %   18 0153 - - - - 90 %   18 0139 - - - - 94 %   18 0138 98.6 °F (37 °C) 72 23 162/76 (!) 89 %     Temp (24hrs), Av.4 °F (36.9 °C), Min:98.2 °F (36.8 °C), Max:98.6 °F (37 °C)    O2 Flow Rate (L/min): 2 l/min O2 Device: Nasal cannula    PHYSICAL EXAM:   General:    Alert, cooperative in no distress     HEENT: Normocephalic, atraumatic    PERRL, EOMI  Sclera no icterus    Nasal mucosa without masses or discharge  Hearing intact to voice    Oropharynx without erythema or exudate  No thrush  Pink MM  Neck:  No meningismus, trachea midline, no carotid bruits     Thyroid not enlarged, no nodules or tenderness  Lungs:   Decreased BS left hemithorax.  No wheezing or rales    No accessory muscle use or retractions. Heart:   Regular rate and rhythm,  no murmur or gallop. No LE edema  Abdomen:   Soft, non-tender. Mildly distended. Bowel sounds normal.     No masses, No Hepatosplenomegaly, No Rebound or guarding  Lymph nodes: No cervical or inguinal MARK  Musculoskeletal:  No Joint swelling, erythema, warmth. No Cyanosis or clubbing  Skin:      No rashes. Not Jaundiced   No nodules or thickening  Neurologic: Alert and oriented X 3, follows commands     Cranial nerves 2 to 12 intact    Symmetric motor strength bilaterally       LAB DATA REVIEWED:    Recent Results (from the past 12 hour(s))   CBC WITH AUTOMATED DIFF    Collection Time: 04/05/18  1:51 AM   Result Value Ref Range    WBC 8.4 3.6 - 11.0 K/uL    RBC 3.20 (L) 3.80 - 5.20 M/uL    HGB 8.6 (L) 11.5 - 16.0 g/dL    HCT 27.0 (L) 35.0 - 47.0 %    MCV 84.4 80.0 - 99.0 FL    MCH 26.9 26.0 - 34.0 PG    MCHC 31.9 30.0 - 36.5 g/dL    RDW 14.9 (H) 11.5 - 14.5 %    PLATELET 182 407 - 288 K/uL    MPV 10.6 8.9 - 12.9 FL    NRBC 0.0 0  WBC    ABSOLUTE NRBC 0.00 0.00 - 0.01 K/uL    NEUTROPHILS 66 32 - 75 %    LYMPHOCYTES 17 12 - 49 %    MONOCYTES 13 5 - 13 %    EOSINOPHILS 3 0 - 7 %    BASOPHILS 1 0 - 1 %    IMMATURE GRANULOCYTES 1 (H) 0.0 - 0.5 %    ABS. NEUTROPHILS 5.5 1.8 - 8.0 K/UL    ABS. LYMPHOCYTES 1.4 0.8 - 3.5 K/UL    ABS. MONOCYTES 1.1 (H) 0.0 - 1.0 K/UL    ABS. EOSINOPHILS 0.3 0.0 - 0.4 K/UL    ABS. BASOPHILS 0.1 0.0 - 0.1 K/UL    ABS. IMM.  GRANS. 0.1 (H) 0.00 - 0.04 K/UL    DF AUTOMATED     CK W/ CKMB & INDEX    Collection Time: 04/05/18  1:51 AM   Result Value Ref Range    CK 80 26 - 192 U/L    CK - MB 5.9 (H) <3.6 NG/ML    CK-MB Index 7.4 (H) 0 - 2.5     METABOLIC PANEL, COMPREHENSIVE    Collection Time: 04/05/18  1:51 AM   Result Value Ref Range    Sodium 139 136 - 145 mmol/L    Potassium 3.2 (L) 3.5 - 5.1 mmol/L    Chloride 104 97 - 108 mmol/L    CO2 23 21 - 32 mmol/L    Anion gap 12 5 - 15 mmol/L    Glucose 136 (H) 65 - 100 mg/dL    BUN 95 (H) 6 - 20 MG/DL    Creatinine 7.83 (H) 0.55 - 1.02 MG/DL    BUN/Creatinine ratio 12 12 - 20      GFR est AA 6 (L) >60 ml/min/1.73m2    GFR est non-AA 5 (L) >60 ml/min/1.73m2    Calcium 11.6 (H) 8.5 - 10.1 MG/DL    Bilirubin, total 0.2 0.2 - 1.0 MG/DL    ALT (SGPT) 16 12 - 78 U/L    AST (SGOT) 16 15 - 37 U/L    Alk.  phosphatase 89 45 - 117 U/L    Protein, total 7.1 6.4 - 8.2 g/dL    Albumin 2.8 (L) 3.5 - 5.0 g/dL    Globulin 4.3 (H) 2.0 - 4.0 g/dL    A-G Ratio 0.7 (L) 1.1 - 2.2     MAGNESIUM    Collection Time: 04/05/18  1:51 AM   Result Value Ref Range    Magnesium 2.5 (H) 1.6 - 2.4 mg/dL   NT-PRO BNP    Collection Time: 04/05/18  1:51 AM   Result Value Ref Range    NT pro-BNP 8324 (H) 0 - 125 PG/ML   PROTHROMBIN TIME + INR    Collection Time: 04/05/18  1:51 AM   Result Value Ref Range    INR 1.1 0.9 - 1.1      Prothrombin time 11.3 (H) 9.0 - 11.1 sec   PTT    Collection Time: 04/05/18  1:51 AM   Result Value Ref Range    aPTT 27.8 22.1 - 32.0 sec    aPTT, therapeutic range     58.0 - 77.0 SECS   TROPONIN I    Collection Time: 04/05/18  1:51 AM   Result Value Ref Range    Troponin-I, Qt. <0.04 <0.05 ng/mL   OCCULT BLOOD, STOOL    Collection Time: 04/05/18  2:12 AM   Result Value Ref Range    Occult blood, stool NEGATIVE  NEG     TYPE & SCREEN    Collection Time: 04/05/18  2:12 AM   Result Value Ref Range    Crossmatch Expiration 04/08/2018     ABO/Rh(D) Rosalio Ovidio POSITIVE     Antibody screen NEG    INFLUENZA A & B AG (RAPID TEST)    Collection Time: 04/05/18  2:12 AM   Result Value Ref Range    Influenza A Antigen NEGATIVE  NEG      Influenza B Antigen NEGATIVE  NEG     LACTIC ACID    Collection Time: 04/05/18  2:33 AM   Result Value Ref Range    Lactic acid 1.4 0.4 - 2.0 MMOL/L   URINALYSIS W/ REFLEX CULTURE    Collection Time: 04/05/18  4:45 AM   Result Value Ref Range    Color YELLOW/STRAW      Appearance CLOUDY (A) CLEAR      Specific gravity 1.014 1.003 - 1.030      pH (UA) 5.5 5.0 - 8.0      Protein 100 (A) NEG mg/dL    Glucose NEGATIVE  NEG mg/dL    Ketone NEGATIVE  NEG mg/dL    Bilirubin NEGATIVE  NEG      Blood TRACE (A) NEG      Urobilinogen 0.2 0.2 - 1.0 EU/dL    Nitrites NEGATIVE  NEG      Leukocyte Esterase NEGATIVE  NEG      WBC 0-4 0 - 4 /hpf    RBC 0-5 0 - 5 /hpf    Epithelial cells FEW FEW /lpf    Bacteria NEGATIVE  NEG /hpf    UA:UC IF INDICATED CULTURE NOT INDICATED BY UA RESULT CNI      Amorphous Crystals 2+ (A) NEG       CXR read by radiology and reviewed by myself shows FINDINGS:  AP portable view of the chest demonstrates cardiomegaly. The lungs are  adequately expanded. There is left basilar consolidation and associated  effusion/volume loss. The osseous structures are unremarkable. IMPRESSION:  Left basilar consolidation and effusion. CT scan chest FINDINGS:  THYROID: Unremarkable. MEDIASTINUM/ISABELL: No mass or lymphadenopathy. Small to moderate hiatal hernia. HEART/PERICARDIUM: Slightly enlarged. Small pericardial fluid. LUNGS/PLEURA: Right basilar atelectasis. Diminished left lung volume with large  pleural effusion. Destructive soft tissue mass anterior left upper lobe  extending into the mediastinum as well as through the ribs in the anterior chest  wall. Mass measures 6.9 x 5.9 x 5.6 cm. Suspect separate pleural mass left  posterior lung base measures 3.0 x 1.8 cm. INCIDENTALLY IMAGED UPPER ABDOMEN: Small ill-defined 1.1 cm splenic hypodensity. BONES: Moderate compression deformity of T12, age indeterminate. Lytic expansile  mass involving the left posterior seventh rib measures 1.7 x 2.9 cm, with  several other subtle lytic lesions involving the bony thorax. ADDITIONAL COMMENTS:  Postsurgical changes partly visualized right breast.   Partly visualized asymmetric density deep left lateral breast.  IMPRESSION:  1.  Large soft tissue mass anterior left upper lobe/left mediastinum, with  extension into the deep left breast and associated destructive change of the  anterior first and second ribs. Additional pleural-based lesion posterior left  lower lobe, with left lung volume loss and consolidation and large pleural effusion. 2. Several osseous metastases largest of which is in the left posterior seventh rib. 3. Postsurgical changes right breast. Partly visualized asymmetry deep left  breast which may be better evaluated with mammography as clinically indicated. 4. Nonspecific 1.1 cm splenic hypodensity    I saw the patient personally, took a history and did a complete physical exam at the bedside. I performed complex decision making in coming up with a diagnostic and treatment plan for the patient. I reviewed the patient's past medical records, current laboratory and radiology results, and actual Xray films/EKG. I have also discussed this case with the involved ED physician.     Care Plan discussed with:    Patient, ED Doc    Risk of deterioration:  High    Total Time Coordinating Admission: 65    minutes    Total Critical Care Time:         Allison Ramos MD

## 2018-04-05 NOTE — PROGRESS NOTES
Spiritual Care Partner Volunteer visited patient in Gen Surg on April 5, 2018.   Documented by:  GUILLERMINA Sorto, [facility], dilma AMOS [facility] Paging Service  [phone]

## 2018-04-05 NOTE — ROUTINE PROCESS
Name of procedure: US guided left thoracentesis, pt tolerated procedure well with a total of 1120 ml of clear yellow pleural fluid removed, specimens taken to lab as ordered. Complications, if any, r/t procedure:None    Sedation medications given:None    Sedation tolerated: N/A    VS : Stable     Post Procedure Care Needed/order sets in connectcare: Yes post orders placed into connect care    Any other specific needs to pt. Care:Monitor pt for bleeding at the puncture site. Post chest xray completed, pt transported back to room 2105 with transporters x 2. Attempted to call report x 2, no answer, please call [phone] for questions regarding procedure.

## 2018-04-05 NOTE — PROGRESS NOTES
Interdisciplinary Rounds were completed on this patient. Rounds included nursing, clinical care leader, pharmacy, and case management. Patient was doing well without problems. Patient had the following concerns: would like to eat (contacted Dr. Lance Wilson)    Goals for the day will include: continue RX as is. Anticipated discharge date: 4/6/2018    Patient lives in [facility] recommending home health - case management to follow up regarding options for discharge.

## 2018-04-05 NOTE — CONSULTS
[facility] Consult Service Note  Pulmonary, Critical Care, and Sleep Medicine    Name: Sol Garnett MRN: [id number]   : 1945 Hospital: [facility]   Date: 2018   Hospital Day: 1       Subjective/Interval History:   I have reviewed the notes from other providers and old records readily available and summarized findings below with the flowsheet. Seen earlier today on rounds. IMPRESSION:   1. Large left pleural effusion with compressive atelectasis  2. Abnormal chest CT scan- possible Mass of left lung/mediastinum (2018) POA obscured by effusion but has multiple ligia lesions. Cytology sent frompleural fluid  3. Acute kidney injury POA admit BUN/creat 95/7.83; K 3.2  4. Anemia of chronic disease POA ? Related to chronic kidney disease HgB 8.6;no gross bleeding  5. History of right breast cancer POA  s/p OR/XRT 3 years ago in [place]- now on arimidex  6. Multiorgan dysfunction as outlined above  7. Additional workup outlined below  8. Pt is requiring Drug therapy requiring intensive monitoring for toxicity  9. Prognosis guarded with high risk of sudden decline   10. Discussed with nurse this am.       RECOMMENDATIONS/PLAN:   1. CXR in AM-   2. If CXR ok and pt feeling better and if sats acceptable, would release pt home and hope pt can be admitted by PCP back home; 12- hour drive??  3. Will monitor O2 needs  4. Agree with Dr. Danna Mcgrath and Fiordaliza Currie - assist pt with safe return to [place]  5. Pt should have all images digitized and given to her for physiicans in [place]. 6. Therapeutic thoracentesis has helped, over two liters removed but will recur. 7. Supplemental O2 prn to keep sats > 90%  8. Bronchial hygiene with respiratory therapy techniques  9. Prescription drug management with home med reconciliation reviewed       PCP:              Noe Meza M.D.  At [facility] in [facility] [phone]         My assessment/ and management was discussed with:  nursing    respiratory therapy Dr.   family      Pt's condition is acute and unstable requiring inpatient hospitalization. This care involved high complexity decision making which includes independently reviewing the patient's past medical records, current laboratory results, medication profiles that were immediately available to me and actual Xray images at the bedside in order to assess, support vital system function, and to treat this degree of vital organ system failure, and to prevent further deterioration of the patients condition. Risk of deterioration: medium and high   [x] High complexity decision making was performed  [x] See my orders for details  Tubes:       Subjective/Initial History:     I was asked by Vijay Castillo MD to see Sabino Doctor  a 68 y.o.  female in consultation for a chief complaint of large left pleural effusion with SOB    68 Y.O. WF normally lives on [address]. They spend time in [place] couple months a year and was returning to [place]. Stopped in a hotel in [place] for the night. Last night she acutely developed severe SOB at night 'Like she was smothering\" decided she had to come to ED. No chest pain. Notes over past 2 -3 weeks, progressively more fatigued doing minor activities. Has one bout of nausea, vomiting and diarrhea which resolved in 24 hours. She has since noticed worsening TORRES, very winded walking even 20 feet, a definite change from her prior baseline. Pt a retired teacher and breast cancer survivor three years ago. Had bad GERD and has had cough without sputum, no chest pain, positive orthopnea. No fevers, chills, no definite weight loss, may a few lbs in the past week. She had sats 89% room air in the ED. CXR with opacified left hemithorax, ER treated for pneumonia but had no fever or leukocytosis.  CT scan chest showed large soft tissue mass anterior left upper lobe/left mediastinum, with extension into the deep left breast and had associated destructive change of the anterior first and second ribs. Additional pleural-based lesion posterior left lower lobe, with left lung volume loss and consolidation and large pleural effusion. Several osseous metastases largest of which is in the left posterior seventh rib. Postsurgical changes right breast.    No Known Allergies     MAR reviewed and pertinent medications noted or modified as needed   Current Facility-Administered Medications   Medication    sodium chloride (NS) flush 5-10 mL    sodium chloride (NS) flush 5-10 mL    sodium chloride (NS) flush 5-10 mL    sodium chloride (NS) flush 5-10 mL    acetaminophen (TYLENOL) tablet 650 mg    oxyCODONE-acetaminophen (PERCOCET) 5-325 mg per tablet 1 Tab    naloxone (NARCAN) injection 0.4 mg    ondansetron (ZOFRAN) injection 4 mg    bisacodyl (DULCOLAX) suppository 10 mg    0.9% sodium chloride infusion    anastrozole (ARIMIDEX) tablet 1 mg    hydrALAZINE (APRESOLINE) 20 mg/mL injection 20 mg    heparin (porcine) injection 5,000 Units      PMH:  has a past medical history of Breast cancer (HCC) and Hypertension. PSH:   has a past surgical history that includes hx gyn; pr breast surgery procedure unlisted; and hx lymphadenectomy. FHX: Family history is unknown by patient. SHX:  reports that she has never smoked. She has never used smokeless tobacco. She reports that she drinks alcohol. She reports that she does not use illicit drugs. ROS:A comprehensive review of systems was negative except for that written in the HPI.     Objective:     Vital Signs: Intake/Output: Intake/Output:   Temp (24hrs), Av.3 °F (36.8 °C), Min:98 °F (36.7 °C), Max:98.9 °F (37.2 °C)   Visit Vitals    /60    Pulse 68    Temp 98 °F (36.7 °C)    Resp 20    Ht 5' 3\" (1.6 m)    Wt 76.7 kg (169 lb)    SpO2 99%    BMI 29.94 kg/m2        Telemetry:     O2 Device: Room air  O2 Flow Rate (L/min): 2 l/min  Wt Readings from Last 4 Encounters:   04/05/18 76.7 kg (169 lb)          Intake/Output Summary (Last 24 hours) at 04/05/18 1608  Last data filed at 04/05/18 1154   Gross per 24 hour   Intake                0 ml   Output             1120 ml   Net            -1120 ml     Last shift:      04/05 0701 - 04/05 1900  In: -   Out: 1120   Last 3 shifts:       Physical Exam:    General:   female; alert and oriented times 3;    HEAD: Normocephalic, without obvious abnormality, atraumatic   EYES: conjunctivae clear. PERRL,  AN Icteric sclerae   NOSE: nares normal, no drainage, no nasal flaring,    THROAT: Lips, mucosa dry; No Thrush; class 4 airway; tongue midline   Neck: Supple, symmetrical, trachea midline,  No accessory mm use; No Stridor/ cuff leak, No goiter or thyroid tenderness   LYMPH: No abnormally enlarged lymph nodes. in neck or groin   Chest: normal   Lungs: decreased air exchange posteriorly - left   Heart: Regular rate and rhythm; 1+ bilateral pedal edema,    Abdomen: soft, non-tender, without masses or organomegaly   : ; No Lange    Musculoskeletal: kyphosis; No spine or CVA tenderness;  no joint swelling or erythema   Neuro: alert; speech fluent ; withdraws to pain; normal gait and station; does follow simple commands   Psych: oriented to time, place and person; No agitation;  normal affect   Skin: Pallor;    Pulses:Bilateral, Radial, 2+   Capillary refill: normal; warm,    Data:     Lab results reviewed. For significant abnormal values and values requiring intervention, see assessment and plan.              Labs:    Recent Labs      04/05/18   0151   WBC  8.4   HGB  8.6*   PLT  306   INR  1.1   APTT  27.8     Recent Labs      04/05/18   0233  04/05/18   0151   NA   --   139   K   --   3.2*   CL   --   104   CO2   --   23   GLU   --   136*   BUN   --   95*   CREA   --   7.83*   CA   --   11.6*   MG   --   2.5*   LAC  1.4   --    ALB   --   2.8*   SGOT   --   16   ALT   --   16 No results for input(s): PH, PCO2, PO2, HCO3, FIO2 in the last 72 hours. Recent Labs      04/05/18   0151   CPK  80   CKNDX  7.4*   TROIQ  <0.04     No results found for: BNPP, BNP   Lab Results   Component Value Date/Time    Culture result: PENDING 04/05/2018 11:45 AM    Culture result: NO GROWTH AFTER 5 HOURS 04/05/2018 02:33 AM     Lab Results   Component Value Date/Time    CK 80 04/05/2018 01:51 AM     Lab Results   Component Value Date/Time    Color YELLOW/STRAW 04/05/2018 04:45 AM    Appearance CLOUDY (A) 04/05/2018 04:45 AM    pH (UA) 5.5 04/05/2018 04:45 AM    Protein 100 (A) 04/05/2018 04:45 AM    Glucose NEGATIVE  04/05/2018 04:45 AM    Ketone NEGATIVE  04/05/2018 04:45 AM    Bilirubin NEGATIVE  04/05/2018 04:45 AM    Blood TRACE (A) 04/05/2018 04:45 AM    Urobilinogen 0.2 04/05/2018 04:45 AM    Nitrites NEGATIVE  04/05/2018 04:45 AM    Leukocyte Esterase NEGATIVE  04/05/2018 04:45 AM    WBC 0-4 04/05/2018 04:45 AM    RBC 0-5 04/05/2018 04:45 AM    Bacteria NEGATIVE  04/05/2018 04:45 AM       No results found for: TOXA1, RPR, HBCM, HBSAG, HAAB, HCAB1, HAAT, G6PD, CRYAC, HIVGT, HIVR, HIV1, HIV12, HIVPC, HIVRPI  No results found for: IRON, FE, TIBC, IBCT, PSAT, FERR  No results found for: SR, CRP, SAM, ANAIGG, RA, RPR, RPRT, VDRLT, VDRLS, TSH, TSHEXT    Imaging:          Results from Hospital Encounter encounter on 04/05/18   XR CHEST PORT   Narrative Clinical indication: Left pleural effusion. Shortness of breath. Portable AP upright view of the chest obtained. This can decrease in the size of  the left pleural effusion. No pneumothorax or shift. Impression impression: Significant decrease in size left pleural effusion.           Results from [place] encounter on 04/05/18   CT CHEST WO CONT   Narrative INDICATION: assess for left lung mass or effusion    COMPARISON: Chest radiograph earlier today    TECHNIQUE:  Noncontrast 5 mm axial images were obtained through the chest. CT  dose reduction was achieved through use of a standardized protocol tailored for  this examination and automatic exposure control for dose modulation. FINDINGS:    THYROID: Unremarkable. MEDIASTINUM/ISABELL: No mass or lymphadenopathy. Small to moderate hiatal hernia. HEART/PERICARDIUM: Slightly enlarged. Small pericardial fluid. LUNGS/PLEURA: Right basilar atelectasis. Diminished left lung volume with large  pleural effusion. Destructive soft tissue mass anterior left upper lobe  extending into the mediastinum as well as through the ribs in the anterior chest  wall. Mass measures 6.9 x 5.9 x 5.6 cm. Suspect separate pleural mass left  posterior lung base measures 3.0 x 1.8 cm. INCIDENTALLY IMAGED UPPER ABDOMEN: Small ill-defined 1.1 cm splenic hypodensity. BONES: Moderate compression deformity of T12, age indeterminate. Lytic expansile  mass involving the left posterior seventh rib measures 1.7 x 2.9 cm, with  several other subtle lytic lesions involving the bony thorax. ADDITIONAL COMMENTS:  Postsurgical changes partly visualized right breast.   Partly visualized asymmetric density deep left lateral breast.         Impression IMPRESSION:    1. Large soft tissue mass anterior left upper lobe/left mediastinum, with  extension into the deep left breast and associated destructive change of the  anterior first and second ribs. Additional pleural-based lesion posterior left  lower lobe, with left lung volume loss and consolidation and large pleural  effusion. 2. Several osseous metastases largest of which is in the left posterior seventh  rib. 3. Postsurgical changes right breast. Partly visualized asymmetry deep left  breast which may be better evaluated with mammography as clinically indicated. 4. Nonspecific 1.1 cm splenic hypodensity. I have personally and independently reviewed the patients interval and diagnostic data, radiographs and have reviewed the reports.     I have ordered additional labs to follow the current medical conditions and to monitor treatment responses over the next 24 hours or sooner if needed. If the pt needs,  additional imaging will be obtained to follow longitudinal changes found on the most current imaging. Thank you for allowing us to participate in the care of this patient. We will be happy to follow with you.     Fabricio Yoder MD

## 2018-04-05 NOTE — PROGRESS NOTES
Patient is a 68 yr old woman admitted with a mass of the left lung. Patient lives with her  in a two story home with 3 steps to enter the home. Patient is independent with ADL/IADL and drives. Patients  will transport pt home at discharge. [place] of [place]. PCP- Dr Meredith Nguyễn  ([phone])  Pharmacy- [facility]    Care Management Interventions  PCP Verified by CM: Yes (Dr Meredith Nguyễn)  Mode of Transport at Discharge:  Other (see comment) ()  Transition of Care Consult (CM Consult): Discharge Planning  Discharge Durable Medical Equipment: No (No DME use)  Physical Therapy Consult: Yes  Occupational Therapy Consult: No  Speech Therapy Consult: No  Current Support Network: Lives with Spouse (Lives in a two story home with 3 steps to enter the home)  Confirm Follow Up Transport: Self  Plan discussed with Pt/Family/Caregiver: Yes  Discharge Location  Discharge Placement: Home    Miguel Brochure  Ext [phone]

## 2018-04-05 NOTE — IP AVS SNAPSHOT
Summary of Care Report The Summary of Care report has been created to help improve care coordination. Users with access to [employer] or [employer] (Web-based application) may access additional patient information including the Discharge Summary. If you are not currently a [facility] user and need more information, please call the number listed below in the [facility] section and ask to be connected with Medical Records. Facility Information Name Address Phone [address] [address] [phone] Patient Information Patient Name Sex ELIO Miller ([id number]) Female 1945 Discharge Information Admitting Provider Service Area Unit Mati Lagos MD / [facility] 2 General Surgery / [phone] Discharge Provider Discharge Date/Time Discharge Disposition Destination (none) 2018 Morning (Pending) AHR (none) Hospital Problems as of 2018  Reviewed: 2018  9:18 AM by Jonah Cruz MD  
  
  
  
 Class Noted - Resolved Last Modified POA Active Problems Mass of left lung  2018 - Present 2018 by Mati Lagos MD Unknown Entered by Mati Lagos MD  
  
Non-Hospital Problems as of 2018  Reviewed: 2018  9:18 AM by Jonah Cruz MD  
 None You are allergic to the following No active allergies Current Discharge Medication List  
  
Notice You have not been prescribed any medications. Follow-up Information Follow up With Details Comments Contact Info  
 you are going STRAIGHT TO the ED at [facility] to ED immediately for continued work up and evaluation Discharge Instructions DISCHARGE DIAGNOSIS: 
Acute respiratory failure with hypoxia POA due to Destructive soft tissue mass anterior left upper lobe and large left pleural effusion s/p thoracentesis with T12 compression deformity and findings of anterior first and second rib involvement, pleural-based lesion posterior left lower lobe, and 7th rib on left involvement Acute kidney injury   
Hypertension   
Anemia suspect chronic illness Hypokalemia   
Hypercalcemia   
H/o right breast cancer MEDICATIONS: 
· It is important that you take the medication exactly as they are prescribed. · Keep your medication in the bottles provided by the pharmacist and keep a list of the medication names, dosages, and times to be taken in your wallet. · Do not take other medications without consulting your doctor. Pain Management: per above medications What to do at [facility] Recommended diet:  Regular Diet Recommended activity: Activity as tolerated If you have questions regarding the hospital related prescriptions or hospital related issues please call [address] [place] [address] at . You can always direct your questions to your primary care doctor if you are unable to reach your hospital physician; your PCP works as an extension of your hospital doctor just like your hospital doctor is an extension of your PCP for your time at the hospital [facility]). If you experience any of the following symptoms then please call your primary care physician or return to the emergency room if you cannot get hold of your doctor: 
Fever, chills, nausea, vomiting, diarrhea, change in mentation, falling, bleeding, shortness of breath Please use the incentive spirometer 5-10 times per hour while awake. Get out of car and walk around at least once every 2hrs Use the pulse oximeter whenever you wish. Please proceed straight to the Emergency room of your choice and give them the packet of information we have given you. You need work up for your lung mass, work up for your kidney failure. Chart Review Routing History No Routing History on File

## 2018-04-05 NOTE — PROGRESS NOTES
Patient off floor at this time. Will defer PT services and continue to follow.      Saloni Bangura, PT, DPT, Isabela Williamson

## 2018-04-05 NOTE — CDMP QUERY
Dr. Que Graves MD :  Please clarify if this patient is (was) being treated/managed for:     => possible Malignant neoplasm of lung as evidenced by CT req pulm cx, bx? O2  => Other explanation of clinical findings  => Clinically Undetermined (no explanation for clinical findings)    The medical record reflects the following clinical findings, treatment, and risk factors. Risk Factors:  73f adm w/ ac resp failure  Clinical Indicators:  H&P--Mass of left lung/mediastinum (4/5/2018) POA new diagnosis, CT scan chest --Large soft tissue mass anterior left upper lobe/left mediastinum, with extension into the deep left breast associated destructive change of the anterior first and second ribs. Treatment: pulm cx, bx? O2    Please clarify and document your clinical opinion in the progress notes and discharge summary including the definitive and/or presumptive diagnosis, (suspected or probable), related to the above clinical findings. Please include clinical findings supporting your diagnosis.   Thank Mahesh Godwin

## 2018-04-05 NOTE — ED NOTES
Per EMS patient reported SOB & dark colored stools x2 weeks. VSS, ekg negative. Per patient, intermittent SOB x6 weeks, with feeling of fatigue. Pt reports dark colored stools x2 weeks. Denies abdominal pain, N/V/D. Denies home oxygen or recent falls/inury/trauma. Per  patient using walker lately to ambulate r/t fatigue, usually more active, \"doing water aerobics and everything but she is just so weak and tired all the time. \" Denies blood thinner use. Denies asthma or copd or cough lately. \"I've just been so tired lately with everything I do. I can breathe and tonight it was like I couldn't catch my breath. \"

## 2018-04-05 NOTE — IP AVS SNAPSHOT
[address] [facility]. 
[phone] Patient: Luis Dominguez MRN: [id number] GBV:[id number] About your hospitalization You were admitted on:  April 5, 2018 You last received care in the:  [facility] 2 GENERAL SURGERY You were discharged on:  April 7, 2018 Why you were hospitalized Your primary diagnosis was:  Not on File Your diagnoses also included: Mass Of Left Lung Follow-up Information Follow up With Details Comments Contact Info  
 you are going STRAIGHT TO the ED at [facility] to ED immediately for continued work up and evaluation Discharge Orders None A check marah indicates which time of day the medication should be taken. My Medications Notice You have not been prescribed any medications. Discharge Instructions DISCHARGE DIAGNOSIS: 
Acute respiratory failure with hypoxia POA due to Destructive soft tissue mass anterior left upper lobe and large left pleural effusion s/p thoracentesis with T12 compression deformity and findings of anterior first and second rib involvement, pleural-based lesion posterior left lower lobe, and 7th rib on left involvement Acute kidney injury   
Hypertension   
Anemia suspect chronic illness Hypokalemia   
Hypercalcemia   
H/o right breast cancer MEDICATIONS: 
· It is important that you take the medication exactly as they are prescribed. · Keep your medication in the bottles provided by the pharmacist and keep a list of the medication names, dosages, and times to be taken in your wallet. · Do not take other medications without consulting your doctor. Pain Management: per above medications What to do at [facility] Recommended diet:  Regular Diet Recommended activity: Activity as tolerated If you have questions regarding the hospital related prescriptions or hospital related issues please call [address] [place] [address] at . You can always direct your questions to your primary care doctor if you are unable to reach your hospital physician; your PCP works as an extension of your hospital doctor just like your hospital doctor is an extension of your PCP for your time at the hospital [facility], [facility]). If you experience any of the following symptoms then please call your primary care physician or return to the emergency room if you cannot get hold of your doctor: 
Fever, chills, nausea, vomiting, diarrhea, change in mentation, falling, bleeding, shortness of breath Please use the incentive spirometer 5-10 times per hour while awake. Get out of car and walk around at least once every 2hrs Use the pulse oximeter whenever you wish. Please proceed straight to the Emergency room of your choice and give them the packet of information we have given you. You need work up for your lung mass, work up for your kidney failure. [employer] Announcement We are excited to announce that we are making your provider's discharge notes available to you in [employer]. You will see these notes when they are completed and signed by the physician that discharged you from your recent hospital stay. If you have any questions or concerns about any information you see in [employer], please call the Health Information Department where you were seen or reach out to your Primary Care Provider for more information about your plan of care. Introducing \[facility] [place] [facility]\"" & HEALTH SERVICES! Slyadean Saint introduces [employer] patient portal. Now you can access parts of your medical record, email your doctor's office, and request medication refills online. 1. In your internet browser, go to [email or web address] 2. Click on the First Time User? Click Here link in the Sign In box. You will see the New Member Sign Up page. 3. Enter your [employer] Access Code exactly as it appears below. You will not need to use this code after youve completed the sign-up process. If you do not sign up before the expiration date, you must request a new code. · [employer] Access Code: [email or web address] Expires: 7/4/2018  1:35 AM 
 
4. Enter the last four digits of your Social Security Number (xxxx) and Date of Birth (mm/dd/yyyy) as indicated and click Submit. You will be taken to the next sign-up page. 5. Create a [employer] ID. This will be your [employer] login ID and cannot be changed, so think of one that is secure and easy to remember. 6. Create a [employer] password. You can change your password at any time. 7. Enter your Password Reset Question and Answer. This can be used at a later time if you forget your password. 8. Enter your e-mail address. You will receive e-mail notification when new information is available in [address] 9. Click Sign Up. You can now view and download portions of your medical record. 10. Click the Download Summary menu link to download a portable copy of your medical information. If you have questions, please visit the Frequently Asked Questions section of the [employer] website. Remember, [employer] is NOT to be used for urgent needs. For medical emergencies, dial 911. Now available from your iPhone and Android! Introducing Aram Moscoso As a [facility] patient, I wanted to make you aware of our electronic visit tool called Aram Donovanagustina. [facility] 24/7 allows you to connect within minutes with a medical provider 24 hours a day, seven days a week via a mobile device or tablet or logging into a secure website from your computer. You can access Aram Donovanjoãofin from anywhere in the [place].  
 
A virtual visit might be right for you when you have a simple condition and feel like you just dont want to get out of bed, or cant get away from work for an appointment, when your regular [facility] provider is not available (evenings, weekends or holidays), or when youre out of town and need minor care. Electronic visits cost only $49 and if the [facility] 24/7 provider determines a prescription is needed to treat your condition, one can be electronically transmitted to a nearby pharmacy*. Please take a moment to enroll today if you have not already done so. The enrollment process is free and takes just a few minutes. To enroll, please download the [facility] 24/7 hina to your tablet or phone, or visit [email or web address] to enroll on your computer. And, as an [address] [place] [address] patient with a [employer] account, the results of your visits will be scanned into your electronic medical record and your primary care provider will be able to view the scanned results. We urge you to continue to see your regular [facility] provider for your ongoing medical care. And while your primary care provider may not be the one available when you seek a [employer] virtual visit, the peace of mind you get from getting a real diagnosis real time can be priceless. For more information on [employer], view our Frequently Asked Questions (FAQs) at [email or web address]. Sincerely, 
 
Matthew Shelton MD 
Chief Medical Officer [address] *:  certain medications cannot be prescribed via [employer] Unresulted Labs-Please follow up with your PCP about these lab tests Order Current Status FREE LIGHT CHAINS, KAPPA/LAMBDA, QT In process PROTEIN ELECTROPHORESIS In process PTH INTACT In process CULTURE, BLOOD, PAIRED Preliminary result CULTURE, BODY FLUID W GRAM STAIN Preliminary result Providers Seen During Your Hospitalization Provider Specialty Primary office phone Eusebio Escobar MD Emergency Medicine [phone] Meagan Marrufo MD Internal Medicine [phone] Your Primary Care Physician (PCP) Primary Care Physician Office Phone Office Fax OTHER, PHYS ** None ** ** None ** You are allergic to the following No active allergies Recent Documentation Height Weight BMI OB Status Smoking Status 1.6 m 76.7 kg 29.94 kg/m2 Postmenopausal Never Smoker Emergency Contacts Name Discharge Info Relation Home Work Mobile None,None N/A  AT THIS TIME [6] Agent [29] [phone] Jens Hardin  Spouse [3]   [phone] Patient Belongings The following personal items are in your possession at time of discharge: 
     Visual Aid: Glasses, With patient Please provide this summary of care documentation to your next provider. Signatures-by signing, you are acknowledging that this After Visit Summary has been reviewed with you and you have received a copy. Patient Signature:  ____________________________________________________________ Date:  ____________________________________________________________  
  
Brenda Boyd Provider Signature:  ____________________________________________________________ Date:  ____________________________________________________________

## 2018-04-05 NOTE — PROGRESS NOTES
Pharmacy Note:     Per [facility] DVT prophylaxis protocol:    Auto substitute Heparin 5000units SQ q 8-12hr in place of lovenox prophylaxis for CrCl < 20ml/min. Patient's crcl is ~ 5-7ml/min, therefore substituted to heparin 5000units SQ q 12hr per protocol.     MONE Barakat

## 2018-04-05 NOTE — PROGRESS NOTES
Problem: Mobility Impaired (Adult and Pediatric)  Goal: *Acute Goals and Plan of Care (Insert Text)  Physical Therapy Goals  Initiated 4/5/2018  1. Patient will transfer from bed to chair and chair to bed with independence using the least restrictive device within 7 day(s). 2.  Patient will perform sit to stand with independence within 7 day(s). 3.  Patient will ambulate with independence for 656 feet with the least restrictive device within 7 day(s). 4.  Patient will ascend/descend 12 stairs with single handrail(s) with modified independence within 7 day(s). 5. Patient will improve TUG score to <13.5 seconds indicating decreased risk of falls within 7 days. physical Therapy EVALUATION  Patient: Brad Abdi (43 y.o. female)  Date: 4/5/2018  Primary Diagnosis: Mass of left lung        Precautions:  Fall    ASSESSMENT :  Based on the objective data described below, the patient presents with impaired activity tolerance, stability, and confidence with activity impacting functional mobility. Patient highly I PTA, exercising 3x/wk, though reports functional decline in recent weeks to recent use of RW with high fatigue. Noted patient with + significant output on thoracentesis and per imaging with bony metastasis. Received in supine, pleasant, with supportive spouse present. They are 'snowbirds' and detailed their yearly transient lifestyle. Patient transitioned easily from 2.5L NCO2 to RA, reporting no dyspnea. She mobilized there forward initially with slowed gait speed, tentative high guard and poor arm swing, with wide ЮЛИЯ although requiring no more than CGA without device. Retrieved RW where patient immediately with + sigificant improvement in above deficits and patient voicing improved confidence level. Patient then performed surprisingly well at 5x sit<>stand test (with score of <59 yo) but does score + for risk of falls on the TUG measure.  No formal fear of falling test administered; she may benefit from this. Throughout activity, VSS RA although she did report minor TORRES post-gait period that resolved within minutes. Patient and spouse advised on importance of OOB activity ahead and voiced agreement; she can safely mobilize within the halls, room with staff or family with use of RW. Patient's high prior functioning warrants further f/u ahead within the acute setting, as well as potential for HH vs OP at DC if much progress is not achieved. This would also A her in fighting decline to be expected with diagnosis. Patient agreeable to this and desires to return to her active lifestyle as able. Suggest stair training, as well as rollator vs SPC trials ahead. She has 'borrowed' a RW recently that spouse will bring in to fit. Patient will benefit from skilled intervention to address the above impairments. Patients rehabilitation potential is considered to be Good  Factors which may influence rehabilitation potential include:   []         None noted  []         Mental ability/status  [x]         Medical condition  []         Home/family situation and support systems  []         Safety awareness  []         Pain tolerance/management  []         Other:      PLAN :  Recommendations and Planned Interventions:  [x]           Bed Mobility Training             []    Neuromuscular Re-Education  [x]           Transfer Training                   []    Orthotic/Prosthetic Training  [x]           Gait Training                         []    Modalities  [x]           Therapeutic Exercises           []    Edema Management/Control  [x]           Therapeutic Activities            [x]    Patient and Family Training/Education  []           Other (comment):    Frequency/Duration: Patient will be followed by physical therapy  5 times a week to address goals.   Discharge Recommendations: Home Health  Further Equipment Recommendations for Discharge: none at this time      SUBJECTIVE:   Patient stated I enjoy rubén and water aerobics.     OBJECTIVE DATA SUMMARY:   HISTORY:    Past Medical History:   Diagnosis Date    Breast cancer ([address]     Hypertension      Past Surgical History:   Procedure Laterality Date    BREAST SURGERY PROCEDURE UNLISTED      HX GYN       x3    HX LYMPHADENECTOMY      \"they tested it but it came back negative. \"     Prior Level of Function/Home Situation: Independent, attending the gym 3x/wk for rubén and water aerobics with her supportive spouse. They are 'snowbirds' and travel from [place] to [place] to [place] for winter/summers. Negative subjective fall screen with exception of patient endorsing + fear of falling within recent weeks as she reports functional decline. This led to her borrowing (now permanently) a friends RW to use within the past few days PTA. No prior O2 use. Retired . Personal factors and/or comorbidities impacting plan of care: [place]? Home Situation  Home Environment: Private residence  # Steps to Enter: 3  Rails to Enter: Yes  Hand Rails : Bilateral  Wheelchair Ramp: No  One/Two Story Residence: [facility] level  # of Interior Steps: 8  Interior Rails: Right  Lift Chair Available: No  Living Alone: No  Support Systems: Spouse/Significant Other/Partner, Friends \ neighbors  Patient Expects to be Discharged to[de-identified] Private residence  Current DME Used/Available at Home: Grab bars  Tub or Shower Type: Tub/Shower combination    EXAMINATION/PRESENTATION/DECISION MAKING:   Critical Behavior:  Neurologic State: Alert  Orientation Level: Oriented X4  Cognition: Appropriate decision making  Safety/Judgement: Awareness of environment  Hearing: Auditory  Auditory Impairment: None    Range Of Motion:  AROM: Within functional limits                       Strength:    Strength:  Within functional limits                    Tone & Sensation:                  Sensation: Intact                Vision:   Corrective Lenses: Glasses (UP TO DATE )  Functional Mobility:  Bed Mobility:  Rolling: Independent  Supine to Sit: Independent     Scooting: Independent  Transfers:  Sit to Stand: Supervision  Stand to Sit: Supervision                       Balance:   Sitting: Intact; Without support  Standing: Impaired; Without support  Standing - Static: Fair (good with RW )  Standing - Dynamic : Fair (good with RW )  Ambulation/Gait Training:  Distance (ft): 290 Feet (ft) (90 without AD, remainder with RW)  Assistive Device: Walker, rolling;Gait belt (initially without AD)  Ambulation - Level of Assistance: Contact guard assistance (CGA without device )     Gait Description (WDL): Exceptions to WDL           Base of Support: Widened     Speed/Mckenzie: Slow;Shuffled (without device )  Step Length: Left shortened;Right shortened                Gait Speed:    Utilizing distance of 22 feet for test, as well as feet to meters calculation, patient ambulated at 0.5 m/s with self-selected gait speed without device. With use of RW, patient ambulated at 0.9 m/s self selected gait speed. Samantha R. \"Comfortable and maximum walking speed of adults aged 20-79 years: reference values and determinants. \" Age and Agin Volume 26(1):15-9. Laura Gomes. \"Age- and gender-related test performance in community-dwelling elderly people: Six-Minute Walk Test, Taylor Balance Scale, Timed Up & Go Test, and gait speeds. \" Physical Therapy: 2002 Volume 82(2):128-37. Kate Chen DM, Carla PW, [place] BAKERD, [facility] D. \"Assessing stability and change of four performance measures: a longitudinal study evaluating outcome following total hip and knee arthroplasty. \" [facility] Musculoskeletal Disorders: 2005 Volume 6(3). Eri Ogden, PhD; Larissa Mishra, PhD. Carlos Medina Paper: \"Walking Speed: the Sixth Vital Sign\" Journal of Geriatric Physical Therapy: 2009 - Volume 32 - Issue 2 - p 25 . Aldo Hernandez DPT; Devon Ruiz PhD; Kelly Schneider PT PHD. Walking Speed:  The Functional Vital Sign. Journal of Aging Phys Act 2015 April; 23(2):314-322. Functional Measure:  Romberg:    Romberg: Pass         This test assesses the ability of the vestibular apparatus in the inner ear to help maintain standing balance. Test is positive if the patient sways or falls while their eyes are closed. (Reference - Physical Examination & Health Assessment (4th edition) written by Vinicius Greene)       Five times sit to stand:    Five Times Sit to Stand: 8 Seconds         Normative averages:  Clients younger than 61years old  £  10 seconds = Normal   Clients older than 61years old £ 14.2 seconds = Normal   Change of ³ 2.3 seconds shows a significant clinical improvement    Samantha RW. Reference values for the five repetition sit to stand test: a descriptive metaanalysis of data from elders. Percept Mot Skills 2006; 103(1):215-222. Timed up and go:    Timed Get Up And Go Test: 16 (without RW)     Timed Up and Go and G-code impairment scale:  Percentage of Impairment CH    0%   CI    1-19% CJ    20-39% CK    40-59% CL    60-79% CM    80-99% CN     100%   Timed   Score 0-56 10 11-12 13-14 15-16 17-18 19 20       < than 10 seconds=Normal  Greater then 13.5 seconds (in elderly)=Increased fall risk   Katie COREAS, Dennise Ruffin. Predicting the probability for falls in community dwelling older adults using the Timed Up and Go Test. Phys Ther. 2000;80:896-903. G codes: In compliance with CMSs Claims Based Outcome Reporting, the following G-code set was chosen for this patient based on their primary functional limitation being treated: The outcome measure chosen to determine the severity of the functional limitation was the TUG with a score of 16 SECONDS which was correlated with the impairment scale.     ? Mobility - Walking and Moving Around:     - CURRENT STATUS: CK - 40%-59% impaired, limited or restricted    - GOAL STATUS: CI - 1%-19% impaired, limited or restricted    - D/C STATUS:  ---------------To be determined---------------      Pain:  Pain Scale 1: Numeric (0 - 10)  Pain Intensity 1: 0  Pain Location 1: Chest           Activity Tolerance: WNL RA - VSS     Please refer to the flowsheet for vital signs taken during this treatment. After treatment:   [x]         Patient left in no apparent distress sitting up in chair  []         Patient left in no apparent distress in bed  [x]         Call bell left within reach  [x]         Nursing notified  [x]         Caregiver present  []         Bed alarm activated    COMMUNICATION/EDUCATION:   The patients plan of care was discussed with: Registered Nurse and Physician. [x]         Fall prevention education was provided and the patient/caregiver indicated understanding. [x]         Patient/family have participated as able in goal setting and plan of care. [x]         Patient/family agree to work toward stated goals and plan of care. []         Patient understands intent and goals of therapy, but is neutral about his/her participation. []         Patient is unable to participate in goal setting and plan of care.     Thank you for this referral.  Jarrod Valdez, PT, DPT, CEEAA      Time Calculation: 33 mins

## 2018-04-06 ENCOUNTER — APPOINTMENT (OUTPATIENT)
Dept: GENERAL RADIOLOGY | Age: 73
DRG: 189 | End: 2018-04-06
Attending: INTERNAL MEDICINE
Payer: MEDICARE

## 2018-04-06 LAB
ALBUMIN SERPL-MCNC: 2.4 G/DL (ref 3.5–5)
ALBUMIN/GLOB SERPL: 0.6 {RATIO} (ref 1.1–2.2)
ALP SERPL-CCNC: 78 U/L (ref 45–117)
ALT SERPL-CCNC: 15 U/L (ref 12–78)
ANION GAP SERPL CALC-SCNC: 13 MMOL/L (ref 5–15)
AST SERPL-CCNC: 14 U/L (ref 15–37)
BASOPHILS # BLD: 0 K/UL (ref 0–0.1)
BASOPHILS NFR BLD: 0 % (ref 0–1)
BILIRUB SERPL-MCNC: 0.2 MG/DL (ref 0.2–1)
BUN SERPL-MCNC: 89 MG/DL (ref 6–20)
BUN/CREAT SERPL: 12 (ref 12–20)
CALCIUM SERPL-MCNC: 11.5 MG/DL (ref 8.5–10.1)
CHLORIDE SERPL-SCNC: 107 MMOL/L (ref 97–108)
CO2 SERPL-SCNC: 21 MMOL/L (ref 21–32)
CREAT SERPL-MCNC: 7.73 MG/DL (ref 0.55–1.02)
CREAT UR-MCNC: 62.3 MG/DL
DIFFERENTIAL METHOD BLD: ABNORMAL
EOSINOPHIL # BLD: 0.4 K/UL (ref 0–0.4)
EOSINOPHIL #/AREA URNS HPF: NEGATIVE /[HPF]
EOSINOPHIL NFR BLD: 5 % (ref 0–7)
ERYTHROCYTE [DISTWIDTH] IN BLOOD BY AUTOMATED COUNT: 15.2 % (ref 11.5–14.5)
GLOBULIN SER CALC-MCNC: 4.2 G/DL (ref 2–4)
GLUCOSE SERPL-MCNC: 101 MG/DL (ref 65–100)
HCT VFR BLD AUTO: 25.8 % (ref 35–47)
HGB BLD-MCNC: 8.3 G/DL (ref 11.5–16)
IMM GRANULOCYTES # BLD: 0 K/UL (ref 0–0.04)
IMM GRANULOCYTES NFR BLD AUTO: 1 % (ref 0–0.5)
LYMPHOCYTES # BLD: 1.4 K/UL (ref 0.8–3.5)
LYMPHOCYTES NFR BLD: 18 % (ref 12–49)
MCH RBC QN AUTO: 26.6 PG (ref 26–34)
MCHC RBC AUTO-ENTMCNC: 32.2 G/DL (ref 30–36.5)
MCV RBC AUTO: 82.7 FL (ref 80–99)
MONOCYTES # BLD: 0.9 K/UL (ref 0–1)
MONOCYTES NFR BLD: 12 % (ref 5–13)
NEUTS SEG # BLD: 5.1 K/UL (ref 1.8–8)
NEUTS SEG NFR BLD: 65 % (ref 32–75)
NRBC # BLD: 0 K/UL (ref 0–0.01)
NRBC BLD-RTO: 0 PER 100 WBC
PLATELET # BLD AUTO: 296 K/UL (ref 150–400)
PMV BLD AUTO: 10.8 FL (ref 8.9–12.9)
POTASSIUM SERPL-SCNC: 3.3 MMOL/L (ref 3.5–5.1)
PROT SERPL-MCNC: 6.6 G/DL (ref 6.4–8.2)
PROT UR-MCNC: 190 MG/DL (ref 0–11.9)
PROT/CREAT UR-RTO: 3
RBC # BLD AUTO: 3.12 M/UL (ref 3.8–5.2)
SODIUM SERPL-SCNC: 141 MMOL/L (ref 136–145)
SODIUM UR-SCNC: 75 MMOL/L
WBC # BLD AUTO: 7.8 K/UL (ref 3.6–11)

## 2018-04-06 PROCEDURE — 97116 GAIT TRAINING THERAPY: CPT

## 2018-04-06 PROCEDURE — 87205 SMEAR GRAM STAIN: CPT | Performed by: INTERNAL MEDICINE

## 2018-04-06 PROCEDURE — 36415 COLL VENOUS BLD VENIPUNCTURE: CPT | Performed by: INTERNAL MEDICINE

## 2018-04-06 PROCEDURE — 97110 THERAPEUTIC EXERCISES: CPT

## 2018-04-06 PROCEDURE — 65660000000 HC RM CCU STEPDOWN

## 2018-04-06 PROCEDURE — 74011250636 HC RX REV CODE- 250/636: Performed by: INTERNAL MEDICINE

## 2018-04-06 PROCEDURE — 84156 ASSAY OF PROTEIN URINE: CPT | Performed by: INTERNAL MEDICINE

## 2018-04-06 PROCEDURE — 80053 COMPREHEN METABOLIC PANEL: CPT | Performed by: INTERNAL MEDICINE

## 2018-04-06 PROCEDURE — 85025 COMPLETE CBC W/AUTO DIFF WBC: CPT | Performed by: INTERNAL MEDICINE

## 2018-04-06 PROCEDURE — 84300 ASSAY OF URINE SODIUM: CPT | Performed by: INTERNAL MEDICINE

## 2018-04-06 PROCEDURE — 71046 X-RAY EXAM CHEST 2 VIEWS: CPT

## 2018-04-06 RX ADMIN — ANASTROZOLE 1 MG: 1 TABLET, COATED ORAL at 09:30

## 2018-04-06 RX ADMIN — SODIUM CHLORIDE 75 ML/HR: 900 INJECTION, SOLUTION INTRAVENOUS at 05:41

## 2018-04-06 RX ADMIN — Medication 10 ML: at 18:02

## 2018-04-06 RX ADMIN — Medication 10 ML: at 06:29

## 2018-04-06 RX ADMIN — HEPARIN SODIUM 5000 UNITS: 5000 INJECTION, SOLUTION INTRAVENOUS; SUBCUTANEOUS at 06:28

## 2018-04-06 RX ADMIN — HEPARIN SODIUM 5000 UNITS: 5000 INJECTION, SOLUTION INTRAVENOUS; SUBCUTANEOUS at 18:02

## 2018-04-06 RX ADMIN — Medication 10 ML: at 14:00

## 2018-04-06 RX ADMIN — Medication 10 ML: at 21:51

## 2018-04-06 NOTE — PROGRESS NOTES
Spoke with Dr. Ham Bojorquez concerning patient during rounds and patient lives in [place] and needs continual medical care and wishes to receive this in her home city and state. He has spoken with her physicians in her home state to coordinate care. Patient needs to travel with oxygen to home. Per the physician patient this is to be done by car and not ambulance as patient does not require ambulance transport for medical stability. Amanda Villalta needed that is in both [address] [place] [address] and [place] in order to accomplish this set up for the patient. Earlier care manager attempted through [address] without success. Also have attempted to purchase several tanks for the patient along with oxygen tank flow meter from [employer] but they are unable to do this because they are not a company that can openly sell oxygen tanks and it goes against their license. Have also called and sent the referral to Titi Dominguez in [facility] and in [place] for coordination of care to see if they can assist the patient. Phone call place to [place] who is the Titi Dominguez representative that [facility] works with who will also work on the referral. He states that the company in [place] will have to approve her and then the [facility] division will supply the tanks for the travel to [place]. Referrals were sent to Titi Dominguez through [employer] and they have accepted the referral pending insurance verification, qualification, and will let us know of any further documentation needed. Email was sent out to the weekend care managers concerning the patient and her needs also for follow up. One concern that was discussed with both Jamey and with Titi Dominguez being that the patient is being transported by family is the pending weather in the [facility] and the possibility of her getting delayed in her return to [place] and running out of oxygen on the trip to home. It may be safer for the patient to travel via ambulance to [place] and this can be set up for her.  The would prevent her from needing to find another health care facility if she should be delayed during the travel. Care Management will continue to follow and assist with the patients discharge.  Also went and discussed with the patient plans that were being attempted to obtain the oxygen for her and answered her questions. Moriah Rosales of Care Management

## 2018-04-06 NOTE — PROGRESS NOTES
General Surgery End of Shift Nursing Note    Bedside shift change report given to Juana GOMEZ (oncoming nurse) by Arjun Massey RN (offgoing nurse). Report included the following information SBAR, Kardex, Intake/Output, MAR and Recent Results. Shift worked:   11p-7a   Summary of shift:    Pt slept most of the shift. Offered no c/o. Ambulated to the bathroom x2. Issues for physician to address:   N/A     Number times ambulated in hallway past shift: 0    Number of times OOB to chair past shift: 2    Pain Management:  Current medication: Percocet  Patient states pain is manageable on current pain medication: YES    GI:    Current diet:  DIET NPO Except Meds    Tolerating current diet: YES  Passing flatus: NO  Last Bowel Movement: yesterday    Respiratory:    Incentive Spirometer at bedside: YES  Patient instructed on use: YES    Patient Safety:    Falls Score: 1  Bed Alarm On? No  Sitter?  No    Richy Munson RN

## 2018-04-06 NOTE — PROGRESS NOTES
Hospitalist  Thoracentesis:   -LDH is >2/3 upper limit of ldh  -but pleural protein/serum protein is 0.36    Will discuss next steps with pulmonary and renal.   Valerie Weir MD

## 2018-04-06 NOTE — PROGRESS NOTES
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 1120mL of clear yellow fluid removed. Improved symptomatically as well  -CXR in AM  -requiring O2 with movement as she was 87% on RA  -she needs the rest of her tumor work up to occur - needs CT head, abd/pelvis. Needs tissue if the pleural fluid does not yield cells. This needs to be arranged by her PCP Dr Janine Brunner whom we are attempting ot contact on her cell phone [phone]. If able to arrange her to have her work up done immediately that would be ideal - her stopping on the way home for another admission is not ideal.    -working ot see if she will remain with a need for O2 during travel. Resting ra is sufficient. Will continue to work with moving ra - add IS     Acute kidney injury POA admit BUN/creat 95/7.83  hypercalcemia  -no prior records to review  -renal US no mass/lesion. No hydroureteronephrosis. Mild med renal dz, no bladder outlet obstruction  -she is micturating  -urine pale  -renal to see and weigh in - she will obviously need follow up in [place] and without overt acidosis, hyperkalemia I feel she could be dc to the MD care in [place]  -possible NSAID injury due to increased dosing for sciatica pains. Appreciate renal insight and aid and agree with holding on treatment for hypercalcemia at this time. Anemia of chronic disease POA  -stool heme neg  -suspect related to renal process  -no role for transfusion at this time     History of right breast cancer POA  s/p OR/XRT 3 years ago in [place]  -No recurrence per pt report, remains on arimidex     Overweight POA Body mass index is 29.94 kg/(m^2). Code status: Full  Prophylaxis: oob to moving  Recommended Disposition: to IP in [place]?      Medical Decision Making Today  · Acute or chronic illness that poses a threat to life or bodily function  · I have reviewed the flowsheet and previous days notes  · One or more chronic illnesses with severe exacerbation, progression or side effects of treatment  · Review and order of Clinical lab tests  · Discuss case with Specialist MD    Subjective:     Chief Complaint / Reason for Physician Visit  \"i feel much better\". Discussed with RN events overnight. S/p us renal, ct chest. Findings discussed. She feels better post thoracentesis. Walking in room. Improved strength    Review of Systems:  Symptom Y/N Comments  Symptom Y/N Comments   Fever/Chills n   Chest Pain n    Poor Appetite n   Edema     Cough n   Abdominal Pain n    Sputum n   Joint Pain     SOB/TORRES y   Pruritis/Rash     Nausea/vomit n   Tolerating PT/OT     Diarrhea n   Tolerating Diet y    Constipation    Other       Could NOT obtain due to:      Objective:     VITALS:   Last 24hrs VS reviewed since prior progress note. Most recent are:  Patient Vitals for the past 24 hrs:   Temp Pulse Resp BP SpO2   04/06/18 1147 97.9 °F (36.6 °C) 72 18 144/63 98 %   04/06/18 0400 98.6 °F (37 °C) 72 18 136/60 99 %   04/06/18 0000 98.2 °F (36.8 °C) 70 18 134/62 99 %   04/05/18 1519 98 °F (36.7 °C) 68 20 137/60 99 %       Intake/Output Summary (Last 24 hours) at 04/06/18 1420  Last data filed at 04/06/18 0400   Gross per 24 hour   Intake             1375 ml   Output                0 ml   Net             1375 ml        PHYSICAL EXAM:  General: WD, pleasant cf sitting up in chair, interactive Alert, cooperative, no acute distress    EENT:  EOMI. Anicteric sclerae. MM dry  Resp:  Left basilar crackles. no wheezing or rales. No accessory muscle use  CV:  Regular  rhythm,  No edema  GI:  Soft, Non distended, Non tender.  +Bowel sounds  Neurologic:  Alert and oriented X 3, normal speech  Psych:   Good insight. Not anxious nor agitated  Skin:  no rashes or ulcers.   No jaundice    Reviewed most current lab test results and cultures YES  Reviewed most current radiology test results   YES  Review and summation of old records today    NO  Reviewed patient's current orders and MAR    YES  PMH/SH reviewed - no change compared to H&P  ________________________________________________________________________  Care Plan discussed with:    Comments   Patient x Discussed with patient in room. POC discussed. Questions answered (15   Family  x  at side   RN     Care Manager     Consultant: eros Antony, dr Pastor Gerber. Dr You Bethel partner - Dr Tj Ivy team rounds were held today with , nursing, pharmacist and clinical coordinator. Patient's plan of care was discussed; medications were reviewed and discharge planning was addressed. ________________________________________________________________________  Total NON critical care TIME:  50   Minutes    Total CRITICAL CARE TIME Spent:   Minutes non procedure based. I have provided critical care time. During this entire length of time I was immediately available to the patient. The reason for providing this level of medical care was due to a critical illness that impaired one or more vital organ systems, such that there was a high probability of imminent or life threatening deterioration in the patient's condition. This care involved high complexity decision making which includes reviewing the patient's past medical records, current laboratory results, and actual Xray films in order to assess, support vital system function, and to treat this degree of vital organ system failure, and to prevent further life threatening deterioration of the patients condition.           Comments   >50% of visit spent in counseling and coordination of care x See above   ________________________________________________________________________  Procedures: see electronic medical records for all procedures/Xrays and details which were not copied into this note but were reviewed prior to creation of Plan. LABS:  Recent Labs      04/06/18 0356  04/05/18 0151   WBC  7.8  8.4   HGB  8.3*  8.6*   HCT  25.8*  27.0*   PLT  296  306     Recent Labs      04/06/18 0356  04/05/18 0151   NA  141  139   K  3.3*  3.2*   CL  107  104   CO2  21  23   BUN  89*  95*   CREA  7.73*  7.83*   GLU  101*  136*   CA  11.5*  11.6*   MG   --   2.5*     Recent Labs      04/06/18 0356 04/05/18 0151   SGOT  14*  16   ALT  15  16   AP  78  89   TBILI  0.2  0.2   TP  6.6  7.1   ALB  2.4*  2.8*   GLOB  4.2*  4.3*     Recent Labs      04/05/18 0151   INR  1.1   PTP  11.3*   APTT  27.8      No results for input(s): FE, TIBC, PSAT, FERR in the last 72 hours. No results found for: FOL, RBCF   No results for input(s): PH, PCO2, PO2 in the last 72 hours. No results for input(s): PHI, PO2I, PCO2I in the last 72 hours. Recent Labs      04/05/18 0151   CPK  80   CKNDX  7.4*   TROIQ  <0.04     No results found for: CHOL, CHOLX, CHLST, CHOLV, HDL, LDL, LDLC, DLDLP, TGLX, TRIGL, TRIGP, CHHD, CHHDX  No results found for: [facility]  Lab Results   Component Value Date/Time    Color YELLOW/STRAW 04/05/2018 04:45 AM    Appearance CLOUDY (A) 04/05/2018 04:45 AM    Specific gravity 1.014 04/05/2018 04:45 AM    pH (UA) 5.5 04/05/2018 04:45 AM    Protein 100 (A) 04/05/2018 04:45 AM    Glucose NEGATIVE  04/05/2018 04:45 AM    Ketone NEGATIVE  04/05/2018 04:45 AM    Bilirubin NEGATIVE  04/05/2018 04:45 AM    Urobilinogen 0.2 04/05/2018 04:45 AM    Nitrites NEGATIVE  04/05/2018 04:45 AM    Leukocyte Esterase NEGATIVE  04/05/2018 04:45 AM    Epithelial cells FEW 04/05/2018 04:45 AM    Bacteria NEGATIVE  04/05/2018 04:45 AM    WBC 0-4 04/05/2018 04:45 AM    RBC 0-5 04/05/2018 04:45 AM       RADIOGRAPHIC STUDIES:  CXR Results  (Last 48 hours)               04/06/18 0810  XR CHEST PA LAT Final result    Impression:  IMPRESSION:        Persistent left pleural effusion.     Mild T12 compression fracture Narrative:  EXAM:  XR CHEST PA LAT       INDICATION:   effusion       COMPARISON: Prior day chest x-ray and chest CT. FINDINGS: PA and lateral radiographs of the chest demonstrate a persistent left   pleural effusion, with some fluid in the left major fissure. The cardiac and   mediastinal contours and pulmonary vascularity are normal. There is a stable   mild compression of the T12 vertebral body. 04/05/18 1233  XR CHEST PORT Final result    Impression:   impression: Significant decrease in size left pleural effusion. Narrative:  Clinical indication: Left pleural effusion. Shortness of breath. Portable AP upright view of the chest obtained. This can decrease in the size of   the left pleural effusion. No pneumothorax or shift. 04/05/18 0208  XR CHEST PORT Final result    Impression:  IMPRESSION:   Left basilar consolidation and effusion. Narrative:  INDICATION:   SOB       EXAM:  AP CHEST RADIOGRAPH       COMPARISON: None       FINDINGS:       AP portable view of the chest demonstrates cardiomegaly. The lungs are   adequately expanded. There is left basilar consolidation and associated   effusion/volume loss. The osseous structures are unremarkable. CT Results  (Last 48 hours)               04/05/18 0408  CT CHEST WO CONT Final result    Impression:  IMPRESSION:       1. Large soft tissue mass anterior left upper lobe/left mediastinum, with   extension into the deep left breast and associated destructive change of the   anterior first and second ribs. Additional pleural-based lesion posterior left   lower lobe, with left lung volume loss and consolidation and large pleural   effusion. 2. Several osseous metastases largest of which is in the left posterior seventh   rib. 3. Postsurgical changes right breast. Partly visualized asymmetry deep left   breast which may be better evaluated with mammography as clinically indicated.    4. Nonspecific 1.1 cm splenic hypodensity. Narrative:  INDICATION: assess for left lung mass or effusion       COMPARISON: Chest radiograph earlier today       TECHNIQUE:  Noncontrast 5 mm axial images were obtained through the chest. CT   dose reduction was achieved through use of a standardized protocol tailored for   this examination and automatic exposure control for dose modulation. FINDINGS:       THYROID: Unremarkable. MEDIASTINUM/ISABELL: No mass or lymphadenopathy. Small to moderate hiatal hernia. HEART/PERICARDIUM: Slightly enlarged. Small pericardial fluid. LUNGS/PLEURA: Right basilar atelectasis. Diminished left lung volume with large   pleural effusion. Destructive soft tissue mass anterior left upper lobe   extending into the mediastinum as well as through the ribs in the anterior chest   wall. Mass measures 6.9 x 5.9 x 5.6 cm. Suspect separate pleural mass left   posterior lung base measures 3.0 x 1.8 cm. INCIDENTALLY IMAGED UPPER ABDOMEN: Small ill-defined 1.1 cm splenic hypodensity. BONES: Moderate compression deformity of T12, age indeterminate. Lytic expansile   mass involving the left posterior seventh rib measures 1.7 x 2.9 cm, with   several other subtle lytic lesions involving the bony thorax. ADDITIONAL COMMENTS:  Postsurgical changes partly visualized right breast.    Partly visualized asymmetric density deep left lateral breast.                 Echo Results  (Last 48 hours)    None          VENOUS DOPPLER results  (Last 48 hours)    None          CULTURES:    No results found for: SDES Lab Results   Component Value Date/Time    Culture result: NO GROWTH AFTER 19 HOURS 04/05/2018 11:45 AM    Culture result: NO GROWTH 1 DAY 04/05/2018 02:33 AM          Signed: Yony English MD    This note will not be viewable in [address]

## 2018-04-06 NOTE — PROGRESS NOTES
Bedside and Verbal shift change report given to prasanth Stafford (oncoming nurse) by Martell Farias (offgoing nurse). Report included the following information SBAR, Procedure Summary, Intake/Output, MAR and Recent Results.

## 2018-04-06 NOTE — PROGRESS NOTES
Problem: Mobility Impaired (Adult and Pediatric)  Goal: *Acute Goals and Plan of Care (Insert Text)  Physical Therapy Goals  Initiated 4/5/2018  1. Patient will transfer from bed to chair and chair to bed with independence using the least restrictive device within 7 day(s). 2.  Patient will perform sit to stand with independence within 7 day(s). 3.  Patient will ambulate with independence for 656 feet with the least restrictive device within 7 day(s). 4.  Patient will ascend/descend 12 stairs with single handrail(s) with modified independence within 7 day(s). 5. Patient will improve TUG score to <13.5 seconds indicating decreased risk of falls within 7 days. physical Therapy TREATMENT  Patient: Lennox Flora (73 y.o. female)  Date: 4/6/2018  Diagnosis: Mass of left lung        Precautions: Fall  Chart, physical therapy assessment, plan of care and goals were reviewed. ASSESSMENT:pt tolerated tx well, no LOB, min SOB (O2 sats down to 87% on room air), did well with bed mob and transfers, good motivation,does well with ther-ex, vc's for safety and proper RW use. Progression toward goals:  []    Improving appropriately and progressing toward goals  [x]    Improving slowly and progressing toward goals  []    Not making progress toward goals and plan of care will be adjusted     PLAN:  Patient continues to benefit from skilled intervention to address the above impairments. Continue treatment per established plan of care.   Discharge Recommendations:  Home Health  Further Equipment Recommendations for Discharge:  rolling walker     OBJECTIVE DATA SUMMARY:     Critical Behavior:  Neurologic State: Alert  Orientation Level: Oriented X4  Cognition: Appropriate decision making, Follows commands  Safety/Judgement: Awareness of environment     Functional Mobility Training:  Bed Mobility:  Rolling: Independent  Supine to Sit: Independent  Scooting: Independent  Level of Assistance: Independent  Interventions: Verbal cues     Transfers:  Sit to Stand: Supervision  Stand to Sit: Supervision  Bed to Chair: Supervision  Interventions: Verbal cues  Level of Assistance: Supervision     Balance:  Sitting: Intact; Without support  Standing: Intact; With support  Standing - Static: Good;Constant support  Standing - Dynamic : Good     Ambulation/Gait Training:  Distance (ft): 250 Feet (ft)  Assistive Device: Gait belt;Walker, rolling  Ambulation - Level of Assistance: Stand-by assistance  Gait Abnormalities: Decreased step clearance  Right Side Weight Bearing: Full  Left Side Weight Bearing: Full  Base of Support: Widened  Speed/Mckenzie: Pace decreased (<100 feet/min)  Step Length: Left shortened;Right shortened    Therapeutic Exercises:   sitting  EXERCISE   Sets   Reps   Active Active Assist   Passive   Comments   Ankle pumps 1 10 [x] [] [] bilat   Heel raises 1 10 [x] [] [] \"   Toe tap 1 10 [x] [] [] \"   Knee ext 1 10 [x] [] [] \"   Hip flex 1 10 [x] [] [] \"     Pain:  Pain Scale 1: Numeric (0 - 10)  Pain Intensity 1: 0    Activity Tolerance: fair    After treatment:   [x]    Patient left in no apparent distress sitting up in chair  []    Patient left in no apparent distress in bed  [x]    Call bell left within reach  [x]    Nursing notified  [x]    Caregiver present  []    Bed alarm activated    COMMUNICATION/COLLABORATION:   The patients plan of care was discussed with: Registered Nurse    Jackson Martin PTA   Time Calculation: 25 mins

## 2018-04-06 NOTE — PROGRESS NOTES
Patient is being discharged with oxygen. Tim Cummings will be supplying the oxygen.     [facility]  Ext [phone]

## 2018-04-06 NOTE — CONSULTS
Consultation Note    NAME: Tomas Molina   :  1945   MRN:  [id number]     Date/Time:  2018 1:52 PM    I have been asked to see this patient by Dr. Sara Lin  for advice/opinion re: ELMER. Assessment :    Plan:  ELMER    HTN    Anemia    Hypokalemia    Hypercalcemia    Destructive soft tissue mass anterior left upper lobe/large left pleural effusion    H/o right breast cancer Creatinine 7.8 to 7.7; normal baseline; active urine sediment (100 protein, trace blood, 0-5 rbc's. Amorphous crystals); cpk ok; LAUREN: Bilateral echogenic kidneys consistent with medical renal disease. No mass, stone or hydronephrosis    Recent NSAID use; poor po intake for 2 weeks    No acute need for RRT. Broad differential for ELMER: prerenal/hypercalcemia vs ATN vs AIN vs secondary membranous vs TLS vs other    Will clarify etiology of high calcium (start with a phos and PTH). Will check urine eos, FeNa and spot urine protein:creatinine. Check uric acid. Will proceed with myeloma labs (SPEP, free light chain ratio)     In the meantime continue with NS. Will check the above non-invasive tests to try to pin down the etiology of ELMER. Before invasive measures, I would suggest clarification of the etiology of the chest mass and its prognosis. Subjective:   CHIEF COMPLAINT:  ELMER    HISTORY OF PRESENT ILLNESS:     Ella Gallo is a 68 y.o.   female who has a history of HTN and breast cancer. Mrs. Connie Palm is from [place], but has been down in [place] for the past 2-3 months. She was doing quite well and in good health until 's day. She noted buttock pain and took advil and started some new exercises. The pain did not improve and she developed some right flank/back pain -->increased dose of Motrin/Advil. She developed some heartburn and cut down on NSAIDs. She has chronic urinary leak, but has not wanted surgery - she wears pads. No hematuria. No foamy urine. No previous kidney problems.  She has had a poor appetite and poor po intake for 2 weeks. Increasing weakness/fatigue. Her  had to do much of the packing up for the drive [address] [place]. No DHRUV. She has had progressive worsening in SOB. They were at a hotel in [place] and she awoke with sob/choking feeling and came here. She has occasional lightheaded feeling. No rash. Past Medical History:   Diagnosis Date    Breast cancer ([address]     Hypertension       Past Surgical History:   Procedure Laterality Date    BREAST SURGERY PROCEDURE UNLISTED      HX GYN       x3    HX LYMPHADENECTOMY      \"they tested it but it came back negative. \"     Social History   Substance Use Topics    Smoking status: Never Smoker    Smokeless tobacco: Never Used    Alcohol use Yes      Comment: occ      Family History   Problem Relation Age of Onset    Family history unknown: Yes      No Known Allergies   Prior to Admission medications    Not on File     REVIEW OF SYSTEMS:     []  Unable to obtain reliable ROS due to  [] mental status  [] sedated   [] intubated   [x] Total of 12 systems reviewed as follows:  Constitutional: negative fever, negative chills, + weight loss  Eyes:   negative visual changes  ENT:   negative sore throat, tongue or lip swelling  Respiratory:  negative cough, + dyspnea  Cards:  negative for  palpitations, lower extremity edema  GI:   negative for nausea, vomiting, diarrhea, and abdominal pain  :  negative for frequency, dysuria  Integument:  negative for rash and pruritus  Heme:  negative for easy bruising and gum/nose bleeding  Musculoskel: negative for myalgias,  back pain;+ muscle weakness  Neuro:  negative for headaches, +dizziness, vertigo  Psych:  negative for feelings of anxiety, depression   Travel?: none    Objective:   VITALS:    Visit Vitals    /63 (BP 1 Location: Left arm, BP Patient Position: At rest)    Pulse 72    Temp 97.9 °F (36.6 °C)    Resp 18    Ht 5' 3\" (1.6 m)    Wt 76.7 kg (169 lb)    SpO2 98%    BMI 29.94 kg/m2 PHYSICAL EXAM:  Gen:  [x]  WD [x]  WN  [] cachectic []  thin []  obese []  disheveled             []  ill apearing  []   Critical  []   Chronic    [x]  No acute distress    HEENT:   [x] NC/AT/PERRLA/EOMI    [] pink conjunctivae      [] pale conjunctivae                  PERRL  [] yes  [] no      [] moist mucosa    [] dry mucosa    hearing intact to voice [x] yes  [] No                 NECK:   supple [] yes  [] no        masses [] yes  [] No               thyroid  []  non tender  []  tender    RESP:   [] CTA bilaterally/no wheezing/rhonchi/rales/crackles    [] rhonchi bilaterally - no dullness  [] wheezing   [x] rhonchi (left lung)   [] crackles     use of accessory muscles [] yes [] no    CARD:   [x]  regular rate and rhythm/No murmurs/rubs/gallops    murmur  [] yes ()  [] no      Rubs  [] yes  [] no       Gallops [] yes  [] no    Rate []  regular  []  irregular        carotid bruits  [] Right  []  Left                 LE edema [] yes  [x] no           JVP  []  yes   []  no    ABD:    [x] soft/non distended/non tender/+bowel sounds/no HSM    []  Rigid    tenderness [] yes [] no   Liver enlargement  []  yes []  no                Spleen enlargement  []  yes []  no     distended []  yes [] no     bowel sound  [] hypoactive   [] hyperactive    LYMPH:    Neck []  yes [x]  no       Axillae []  yes []  no    SKIN:   Rashes []  yes   [x]  no    Ulcers []  yes   []  no               [] tight to palpitation    skin turgor []  good  [] poor  [] decreased               Cyanosis/clubbing []  yes []  no    NEUR:   [x] cranial nerves II-XII grossly intact       [] Cranial nerves deficit                 []  facial droop    []  slurred speech   [] aphasic     [] Strength normal     []  weakness  []  LUE  []   RUE/ []  LLE  []   RLE    follows commands  [x]  yes []  no           PSYCH:   insight [] poor [x] good   Alert and Oriented to  [x] person  [x] place  [x]  time                    [] depressed [] anxious [] agitated  [] lethargic [] stuporous  [] sedated     LAB DATA REVIEWED:    Recent Labs      04/06/18 0356  04/05/18 0151   WBC  7.8  8.4   HGB  8.3*  8.6*   HCT  25.8*  27.0*   PLT  296  306     Recent Labs      04/06/18 0356 04/05/18 0151   NA  141  139   K  3.3*  3.2*   CL  107  104   CO2  21  23   BUN  89*  95*   CREA  7.73*  7.83*   GLU  101*  136*   CA  11.5*  11.6*   MG   --   2.5*     Recent Labs      04/06/18 0356 04/05/18 0151   SGOT  14*  16   ALT  15  16   AP  78  89   TBILI  0.2  0.2   ALB  2.4*  2.8*   GLOB  4.2*  4.3*     Recent Labs      04/05/18 0151   INR  1.1   PTP  11.3*   APTT  27.8      No results for input(s): FE, TIBC, PSAT, FERR in the last 72 hours. No results for input(s): PH, PCO2, PO2 in the last 72 hours. Recent Labs      04/05/18 0151   CPK  80   CKMB  5.9*     No results found for: GLUCPOC    Procedures: see electronic medical records for all procedures/Xrays and details which were not copied into this note but were reviewed prior to creation of Plan.    ________________________________________________________________________       ___________________________________________________  Consulting Physician:  Timothy Sommer MD

## 2018-04-07 VITALS
BODY MASS INDEX: 29.95 KG/M2 | HEART RATE: 80 BPM | OXYGEN SATURATION: 96 % | SYSTOLIC BLOOD PRESSURE: 169 MMHG | WEIGHT: 169 LBS | HEIGHT: 63 IN | RESPIRATION RATE: 18 BRPM | TEMPERATURE: 99.1 F | DIASTOLIC BLOOD PRESSURE: 81 MMHG

## 2018-04-07 LAB
ANION GAP SERPL CALC-SCNC: 15 MMOL/L (ref 5–15)
BUN SERPL-MCNC: 90 MG/DL (ref 6–20)
BUN/CREAT SERPL: 12 (ref 12–20)
CALCIUM SERPL-MCNC: 11.2 MG/DL (ref 8.5–10.1)
CALCIUM SERPL-MCNC: 11.4 MG/DL (ref 8.5–10.1)
CHLORIDE SERPL-SCNC: 109 MMOL/L (ref 97–108)
CO2 SERPL-SCNC: 19 MMOL/L (ref 21–32)
CREAT SERPL-MCNC: 7.8 MG/DL (ref 0.55–1.02)
ERYTHROCYTE [DISTWIDTH] IN BLOOD BY AUTOMATED COUNT: 15.4 % (ref 11.5–14.5)
GLUCOSE SERPL-MCNC: 115 MG/DL (ref 65–100)
HCT VFR BLD AUTO: 23.7 % (ref 35–47)
HGB BLD-MCNC: 7.6 G/DL (ref 11.5–16)
MCH RBC QN AUTO: 26.9 PG (ref 26–34)
MCHC RBC AUTO-ENTMCNC: 32.1 G/DL (ref 30–36.5)
MCV RBC AUTO: 83.7 FL (ref 80–99)
NRBC # BLD: 0 K/UL (ref 0–0.01)
NRBC BLD-RTO: 0 PER 100 WBC
PHOSPHATE SERPL-MCNC: 6.3 MG/DL (ref 2.6–4.7)
PLATELET # BLD AUTO: 279 K/UL (ref 150–400)
PMV BLD AUTO: 11 FL (ref 8.9–12.9)
POTASSIUM SERPL-SCNC: 3.5 MMOL/L (ref 3.5–5.1)
PTH-INTACT SERPL-MCNC: <6.3 PG/ML (ref 18.4–88)
RBC # BLD AUTO: 2.83 M/UL (ref 3.8–5.2)
SODIUM SERPL-SCNC: 143 MMOL/L (ref 136–145)
URATE SERPL-MCNC: 13.4 MG/DL (ref 2.6–6)
WBC # BLD AUTO: 7.9 K/UL (ref 3.6–11)

## 2018-04-07 PROCEDURE — 83883 ASSAY NEPHELOMETRY NOT SPEC: CPT | Performed by: INTERNAL MEDICINE

## 2018-04-07 PROCEDURE — 74011250636 HC RX REV CODE- 250/636: Performed by: INTERNAL MEDICINE

## 2018-04-07 PROCEDURE — 80048 BASIC METABOLIC PNL TOTAL CA: CPT | Performed by: INTERNAL MEDICINE

## 2018-04-07 PROCEDURE — 84155 ASSAY OF PROTEIN SERUM: CPT | Performed by: INTERNAL MEDICINE

## 2018-04-07 PROCEDURE — 83970 ASSAY OF PARATHORMONE: CPT | Performed by: INTERNAL MEDICINE

## 2018-04-07 PROCEDURE — 36415 COLL VENOUS BLD VENIPUNCTURE: CPT | Performed by: INTERNAL MEDICINE

## 2018-04-07 PROCEDURE — 84100 ASSAY OF PHOSPHORUS: CPT | Performed by: INTERNAL MEDICINE

## 2018-04-07 PROCEDURE — 84550 ASSAY OF BLOOD/URIC ACID: CPT | Performed by: INTERNAL MEDICINE

## 2018-04-07 PROCEDURE — 85027 COMPLETE CBC AUTOMATED: CPT | Performed by: INTERNAL MEDICINE

## 2018-04-07 PROCEDURE — 77030027138 HC INCENT SPIROMETER -A

## 2018-04-07 RX ORDER — ENOXAPARIN SODIUM 100 MG/ML
1 INJECTION SUBCUTANEOUS ONCE
Status: COMPLETED | OUTPATIENT
Start: 2018-04-07 | End: 2018-04-07

## 2018-04-07 RX ADMIN — ENOXAPARIN SODIUM 80 MG: 80 INJECTION SUBCUTANEOUS at 11:02

## 2018-04-07 RX ADMIN — SODIUM CHLORIDE 75 ML/HR: 900 INJECTION, SOLUTION INTRAVENOUS at 06:31

## 2018-04-07 RX ADMIN — ANASTROZOLE 1 MG: 1 TABLET, COATED ORAL at 11:02

## 2018-04-07 RX ADMIN — Medication 10 ML: at 06:30

## 2018-04-07 RX ADMIN — HEPARIN SODIUM 5000 UNITS: 5000 INJECTION, SOLUTION INTRAVENOUS; SUBCUTANEOUS at 06:27

## 2018-04-07 NOTE — PROGRESS NOTES
NAME: Marylene Minder        :  1945        MRN:  [id number]        Assessment :    Plan:  --ELMER     HTN     Anemia     Hypokalemia     Hypercalcemia     Destructive soft tissue mass anterior left upper lobe/large left pleural effusion     H/o right breast cancer --Creatinine 7.8; normal at baseline; active urine sediment (100 protein, trace blood, 0-5 rbc's. Amorphous crystals); cpk ok; LAUREN: Bilateral echogenic kidneys consistent with medical renal disease. No mass, stone or hydronephrosis     Recent NSAID use; poor po intake for 2 weeks     No acute need for RRT.     ELMER; could be secondary membranous or AIN with the 3 grams of protein on spot check     Will clarify etiology of high calcium (high phos; PTH pending). High uric acid 13.4. Pending SPEP, free light chain ratio      In the meantime continue with NS.     Clarification of the etiology of the chest mass will direct further w/u and treatment of her ELMER    She is stable to travel to [place] from my standpoint. Subjective:     Chief Complaint:  Feeling ok. Denies current sob. I had a long talk about the above. We discussed being very careful with sodium intake. Review of Systems:    Symptom Y/N Comments  Symptom Y/N Comments   Fever/Chills    Chest Pain     Poor Appetite y   Edema n    Cough    Abdominal Pain     Sputum    Joint Pain     SOB/TORRES y stable  Pruritis/Rash     Nausea/vomit n   Tolerating PT/OT     Diarrhea    Tolerating Diet     Constipation    Other       Could not obtain due to:      Objective:     VITALS:   Last 24hrs VS reviewed since prior progress note.  Most recent are:  Visit Vitals    /73 (BP 1 Location: Right arm, BP Patient Position: At rest)    Pulse 72    Temp 98.5 °F (36.9 °C)    Resp 16    Ht 5' 3\" (1.6 m)    Wt 76.7 kg (169 lb)    SpO2 93%    BMI 29.94 kg/m2     No intake or output data in the 24 hours ending 18 0544 Telemetry Reviewed:     PHYSICAL EXAM:  General: WD, WN. Alert, cooperative, no acute distress  Resp:  Left Rhonchi/Rales. No access. muscle use  CV:  Regular  rhythm,  No murmur (), No Rubs, No Gallops. No edema  GI:  Soft, Non distended, Non tender.  +Bowel sounds, no HSM      Lab Data Reviewed: (see below)    Medications Reviewed: (see below)    PMH/SH reviewed - no change compared to H&P  ________________________________________________________________________  Care Plan discussed with:  Patient y    Family  y    RN     Care Manager                    Consultant:          Comments   >50% of visit spent in counseling and coordination of care       ________________________________________________________________________  Brittanie Wade MD     Procedures: see electronic medical records for all procedures/Xrays and details which  were not copied into this note but were reviewed prior to creation of Plan. LABS:  Recent Labs      04/07/18 0338 04/06/18   0356   WBC  7.9  7.8   HGB  7.6*  8.3*   HCT  23.7*  25.8*   PLT  279  296     Recent Labs      04/07/18 0338 04/06/18 0356 04/05/18   0151   NA  143  141  139   K  3.5  3.3*  3.2*   CL  109*  107  104   CO2  19*  21  23   BUN  90*  89*  95*   CREA  7.80*  7.73*  7.83*   GLU  115*  101*  136*   CA  11.4*  11.5*  11.6*   MG   --    --   2.5*     Recent Labs      04/06/18   0356  04/05/18   0151   SGOT  14*  16   AP  78  89   TP  6.6  7.1   ALB  2.4*  2.8*   GLOB  4.2*  4.3*     Recent Labs      04/05/18 0151   INR  1.1   PTP  11.3*   APTT  27.8      No results for input(s): FE, TIBC, PSAT, FERR in the last 72 hours. No results found for: FOL, RBCF   No results for input(s): PH, PCO2, PO2 in the last 72 hours.   Recent Labs      04/05/18 0151   CPK  80   CKMB  5.9*     No components found for: [place]  Lab Results   Component Value Date/Time    Color YELLOW/STRAW 04/05/2018 04:45 AM    Appearance CLOUDY (A) 04/05/2018 04:45 AM    Specific gravity 1.014 04/05/2018 04:45 AM    pH (UA) 5.5 04/05/2018 04:45 AM    Protein 100 (A) 04/05/2018 04:45 AM    Glucose NEGATIVE  04/05/2018 04:45 AM    Ketone NEGATIVE  04/05/2018 04:45 AM    Bilirubin NEGATIVE  04/05/2018 04:45 AM    Urobilinogen 0.2 04/05/2018 04:45 AM    Nitrites NEGATIVE  04/05/2018 04:45 AM    Leukocyte Esterase NEGATIVE  04/05/2018 04:45 AM    Epithelial cells FEW 04/05/2018 04:45 AM    Bacteria NEGATIVE  04/05/2018 04:45 AM    WBC 0-4 04/05/2018 04:45 AM    RBC 0-5 04/05/2018 04:45 AM       MEDICATIONS:  Current Facility-Administered Medications   Medication Dose Route Frequency    sodium chloride (NS) flush 5-10 mL  5-10 mL IntraVENous Q8H    sodium chloride (NS) flush 5-10 mL  5-10 mL IntraVENous PRN    sodium chloride (NS) flush 5-10 mL  5-10 mL IntraVENous Q8H    sodium chloride (NS) flush 5-10 mL  5-10 mL IntraVENous PRN    acetaminophen (TYLENOL) tablet 650 mg  650 mg Oral Q6H PRN    oxyCODONE-acetaminophen (PERCOCET) 5-325 mg per tablet 1 Tab  1 Tab Oral Q6H PRN    naloxone (NARCAN) injection 0.4 mg  0.4 mg IntraVENous PRN    ondansetron (ZOFRAN) injection 4 mg  4 mg IntraVENous Q4H PRN    bisacodyl (DULCOLAX) suppository 10 mg  10 mg Rectal DAILY PRN    0.9% sodium chloride infusion  75 mL/hr IntraVENous CONTINUOUS    anastrozole (ARIMIDEX) tablet 1 mg  1 mg Oral DAILY    hydrALAZINE (APRESOLINE) 20 mg/mL injection 20 mg  20 mg IntraVENous Q6H PRN    heparin (porcine) injection 5,000 Units  5,000 Units SubCUTAneous Q12H

## 2018-04-07 NOTE — PROGRESS NOTES
WALKING PULSE OX results  -after use of IS:  -room air rest: 97%  -walking RA sats % >50 feet    NO role for oxygen today.   Gideon Momin MD

## 2018-04-07 NOTE — PROGRESS NOTES
Care Management:    Patient discharging her today and  is transporting her home to [place] to follow up with hospital there. She will not discharge on 02, at rest and with ambulating in [place] she maintained her 02 SATs between 95 and 100 %. CM called [employer] and let them know no 02 would be needed.      Ricki Gomez [facility] ac [phone]

## 2018-04-07 NOTE — DISCHARGE SUMMARY
Hospitalist Discharge Summary    NAME: Arvin Sun   :  1945   MRN:  [id number]     DISCHARGE DIAGNOSIS:  Acute respiratory failure with hypoxia POA due to Destructive soft tissue mass anterior left upper lobe and large left pleural effusion s/p thoracentesis with T12 compression deformity and findings of anterior first and second rib involvement, pleural-based lesion posterior left lower lobe, and 7th rib on left involvement  Acute kidney injury    Hypertension    Anemia suspect chronic illness  Hypokalemia    Hypercalcemia    H/o right breast cancer    CONSULTATIONS:  Pulmonary/Intensive care and Nephrology    Follow Up: Follow-up Information     Follow up With Details Comments Contact Info    you are going STRAIGHT TO the ED at [facility] to ED immediately for continued work up and evaluation           Procedures: see electronic medical records for all procedures/Xrays and details which were not copied into this note but were reviewed prior to creation of Plan. Please follow-up tests/labs that are still pendin. Pathology from thoracentesis  2. Multiple myeloma labs    PMH/ reviewed - no change compared to H&P    DISCHARGE SUMMARY/HOSPITAL COURSE: for full details see H&P, daily progress notes, labs, consult notes.  Briefly As Per HPI:   68 Y.O. WF normally lives on [address]  Returning from a prolonged vacation in [place], driving back to [address] [place] [address] in a hotel in [place] for the night  Developed acute SOB at night 'Like she was smothering\" decided she had to come to ED  Notes over past 2 -3 weeks, progressively more fatigued doing minor activities  Worsening TORRES, very winded walking even 20 feet, a definite change from her prior baseline  Cough without sputum, no chest pain, positive orthopnea  No fevers, chills, no definite weight loss, may a few lbs in the past week  Frequent nausea, no recent vomiting  Did have nausea and vomiting and diarrhea x 1 day several weeks ago, lasted only 1 day  Says she has been urinating \"a lot\"     ED sats 89% room air  CXR with opacified left hemithorax, ER treated for pneumonia  No fever or leukocytosis  When I reviewed CT scan, concern for effusion and mass, I ordered a CT scan  CT scan chest          Large soft tissue mass anterior left upper lobe/left mediastinum, with extension into the deep left breast            associated destructive change of the anterior first and second ribs. Additional pleural-based lesion posterior left lower lobe, with left lung volume loss and consolidation and large pleural effusion. Several osseous metastases largest of which is in the left posterior seventh rib. Postsurgical changes right breast.    The patient's hospital course was complicated by:  Acute respiratory failure with hypoxia POA due to Destructive soft tissue mass anterior left upper lobe and large left pleural effusion s/p thoracentesis with T12 compression deformity and findings of anterior first and second rib involvement, pleural-based lesion posterior left lower lobe, and 7th rib on left involvement  Acute kidney injury    Hypertension    Anemia suspect chronic illness  Hypokalemia    Hypercalcemia    H/o right breast cancer    Patient with complex inpatient course. Please see all notes, labs, vitals, testing and procedures for details, briefly the patient was admitted following presentation to ED with sob and weakness. Please see H&P for details. Briefly the patient notes progressively worsened SOB such that she was no longer able to walk more than 10ft without stopping. Further with excessive sleeping. They were travelling from [facility] to [place]. Given profound weakness/sob they stopped here at [facility] for further work up and evaluation which unfortunately has revealed a large left anterior lobar destructive mass with possible involvement of surrounding structures along with a large left pleural effusion. She underwent thoracentesis given her symptomatic hypoxia and removed was 1.1L of yellow fluid. Patient symptoms improved. She was tolerating walking. Required short term O2 for sats with movement <88% but post thoracentesis and with use of incentive spiirometry her O2 needs improved such that for her car trip to [place] she will not require O2 as she is proceeding straight to ED at [facility]. Her work up for left lung mass with lesions has NOT been completed per patient wishes. She wishes to complete this work up in her [facility]. We have pathology pending from the pleural fluid. NO other pathology/biopsy was completed per patient request. Further she will need, in my opinion, imaging of her head, abdomen, pelvis +/- bone scanning. Also noted during this admission was profound ARF with bun 90's and creat 7.8. She had normal function 1yr prior when I spoke with her PCP from [place]. She has been on NSAIDS for right LBP. She is making urine. +poor PO intake over last 2wks. US renal showed mild medical renal disease but no obstructive uropathy, no bladder outlet obstruction, no bladder wall thickening, no stone disease. Nephrology has seen and there are no urgent recommendations as she is not acidotic, not hyperkalemic. We did send uric acid found elevated. Multiple myeloma work up ongoing. The differential, at this time without enough information, is broad to include prerenal/hypercalcemia (possibly of malignancy) vs ATN vs AIN vs secondary membranous vs TLS vs other. PTH sent along with myeloma labs. They are still pending. She is safe for dc at this time to [facility] directly to the ED. We understand this is a precarious DC and the patient and  are aware as well. They wish to be dc regardless as they prefer care in their area if possible. I have spoken with all collaborating MDs and we feel this is a safe point for dc although quite precariious.  The patient will be given a dose of lovenox PRIOR to dc given prolonged car ride and likely this is cancer she is hypercoagulable and hence at elevated risk for developing clot. She and  aware.   _______________________________________________________________________   Patient seen and examined by me on day of discharge. Pertinent findings are:  Gen: ill appearing cf in mild respiratory distress  HEENT: nc at  Chest: left crackles  Cv: no r/g  Abd: s/nd/nt +bs  Neuro: cn 2-12 gi    See Discharge Instructions for further details. _______________________________________________________________________    Medications Reviewed: There are no discharge medications for this patient.    _______________________________________________________________________    Risk of deterioration: High  ________________________________________________________________________    Disposition  STRAIGHT to [place] [address]  ________________________________________________________________________    Care Plan discussed with:   Patient, Family, RN, Care Manager, Consultant  Comment:   ________________________________________________________________________    Code Status: Full Code  ________________________________________________________________________    Total time spent in discharge (min): 72   ________________________________________________________________________    CDMP Checked: Yes    Signed: Sukhdev Degroot MD    This note will not be viewable in [address]

## 2018-04-07 NOTE — DISCHARGE INSTRUCTIONS
DISCHARGE DIAGNOSIS:  Acute respiratory failure with hypoxia POA due to Destructive soft tissue mass anterior left upper lobe and large left pleural effusion s/p thoracentesis with T12 compression deformity and findings of anterior first and second rib involvement, pleural-based lesion posterior left lower lobe, and 7th rib on left involvement  Acute kidney injury    Hypertension    Anemia suspect chronic illness  Hypokalemia    Hypercalcemia    H/o right breast cancer    MEDICATIONS:  · It is important that you take the medication exactly as they are prescribed. · Keep your medication in the bottles provided by the pharmacist and keep a list of the medication names, dosages, and times to be taken in your wallet. · Do not take other medications without consulting your doctor. Pain Management: per above medications    What to do at Home    Recommended diet:  Regular Diet    Recommended activity: Activity as tolerated    If you have questions regarding the hospital related prescriptions or hospital related issues please call [address] [address] at . You can always direct your questions to your primary care doctor if you are unable to reach your hospital physician; your PCP works as an extension of your hospital doctor just like your hospital doctor is an extension of your PCP for your time at the hospital [facility], [facility]). If you experience any of the following symptoms then please call your primary care physician or return to the emergency room if you cannot get hold of your doctor:  Fever, chills, nausea, vomiting, diarrhea, change in mentation, falling, bleeding, shortness of breath    Please use the incentive spirometer 5-10 times per hour while awake. Get out of car and walk around at least once every 2hrs    Use the pulse oximeter whenever you wish. Please proceed straight to the Emergency room of your choice and give them the packet of information we have given you.  You need work up for your lung mass, work up for your kidney failure.

## 2018-04-09 LAB
ALBUMIN SERPL ELPH-MCNC: 2.8 G/DL (ref 2.9–4.4)
ALBUMIN/GLOB SERPL: 0.9 {RATIO} (ref 0.7–1.7)
ALPHA1 GLOB SERPL ELPH-MCNC: 0.4 G/DL (ref 0–0.4)
ALPHA2 GLOB SERPL ELPH-MCNC: 1.1 G/DL (ref 0.4–1)
B-GLOBULIN SERPL ELPH-MCNC: 0.7 G/DL (ref 0.7–1.3)
BACTERIA SPEC CULT: NORMAL
GAMMA GLOB SERPL ELPH-MCNC: 0.8 G/DL (ref 0.4–1.8)
GLOBULIN SER CALC-MCNC: 3.1 G/DL (ref 2.2–3.9)
GRAM STN SPEC: NORMAL
KAPPA LC FREE SER-MCNC: 21.5 MG/L (ref 3.3–19.4)
KAPPA LC FREE/LAMBDA FREE SER: 0.01 {RATIO} (ref 0.26–1.65)
LAMBDA LC FREE SERPL-MCNC: 2059.6 MG/L (ref 5.7–26.3)
M PROTEIN SERPL ELPH-MCNC: 0.2 G/DL
PROT SERPL-MCNC: 5.9 G/DL (ref 6–8.5)
SERVICE CMNT-IMP: NORMAL

## 2018-04-10 LAB
BACTERIA SPEC CULT: NORMAL
SERVICE CMNT-IMP: NORMAL

## 2024-11-08 NOTE — PROGRESS NOTES
-- DO NOT REPLY / DO NOT REPLY ALL --  -- This inbox is not monitored. If this was sent to the wrong provider or department, reroute message to [employer]. --  -- Message is from [facility]agement Center Operations ([facility]) --    General Patient Message: patient was seen in the urgent care yesterday and the medication that was prescribed Bactroban but her insurance will not pay for it because patient is over 21yrs old so patient is asking can something else be sent to her pharmacy        Alternative phone number: none    Can a detailed message be left? Yes - Voicemail   Patient has been advised the message will be addressed within 2-3 business days.                 [facility] Consult Service Note  Pulmonary, Critical Care, and Sleep Medicine    Name: Tomas Molina MRN: [id number]   : 1945 Hospital: [facility]   Date: 2018   Hospital Day: 2       Subjective/Interval History:   I have reviewed the notes from other providers and old records readily available and summarized findings below with the flowsheet. Seen earlier today on rounds. Feels better. Able to take deep breath. No chest pain. Getting gentle IV hydration. Dr. Roya Martinez following      IMPRESSION:   1. Large left pleural effusion with compressive atelectasis  2. Abnormal chest CT scan- possible Mass of left lung/mediastinum (2018) POA obscured by effusion but has multiple ligia lesions. Cytology sent from pleural fluid  3. Acute kidney injury POA admit BUN/creat 95/7.83; K 3.2 on admission  4. Anemia of chronic disease POA ? Related to chronic kidney disease HgB 8.6;no gross bleeding  5. History of right breast cancer POA  s/p OR/XRT 3 years ago in [place]- now on arimidex  6. Multiorgan dysfunction as outlined above  7. Prognosis guarded       RECOMMENDATIONS/PLAN:   1. CXR in AM- reviewed - I think she has complex findings and fluid is stillpresent, cannot tell if more but just a matter of time  2. Pt in no dire need of dialysis and may be best served going straight home to [place] as long as her PCP can arrange for immediate evaluation, admission  3. If sats fall, will be difficult getting portable O2 for her to travel, for now they are acceptable and she is feeling better after the effusion was drained  4. Pt should have all images digitized and given to her for physiicans in [place]. 5. Therapeutic thoracentesis has helped, over two liters removed but will recur. 6. Supplemental O2 prn to keep sats > 90%  7. D/w Dr. Sara Lin       PCP:              Krunal Ortiz M.D.  At [facility] in [facility] [phone]         My assessment/ and management was discussed with:  nursing    respiratory therapy Dr.   family      Pt's condition is acute and unstable requiring inpatient hospitalization. This care involved high complexity decision making which includes independently reviewing the patient's past medical records, current laboratory results, medication profiles that were immediately available to me and actual Xray images at the bedside in order to assess, support vital system function, and to treat this degree of vital organ system failure, and to prevent further deterioration of the patients condition. Risk of deterioration: medium and high   [x] High complexity decision making was performed  [x] See my orders for details  Tubes:       Subjective/Initial History:     I was asked by Vega Meléndez MD to see Kenya Dickinson  a 68 y.o.  female in consultation for a chief complaint of large left pleural effusion with SOB    68 Y.O. WF [address] They spend time in [place] couple months a year and was returning to [place]. Stopped in a hotel in [place] for the night. Last night she acutely developed severe SOB at night 'Like she was smothering\" decided she had to come to ED. No chest pain. Notes over past 2 -3 weeks, progressively more fatigued doing minor activities. Has one bout of nausea, vomiting and diarrhea which resolved in 24 hours. She has since noticed worsening TORRES, very winded walking even 20 feet, a definite change from her prior baseline. Pt a retired teacher and breast cancer survivor three years ago. Had bad GERD and has had cough without sputum, no chest pain, positive orthopnea. No fevers, chills, no definite weight loss, may a few lbs in the past week. She had sats 89% room air in the ED. CXR with opacified left hemithorax, ER treated for pneumonia but had no fever or leukocytosis.  CT scan chest showed large soft tissue mass anterior left upper lobe/left mediastinum, with extension into the deep left breast and had associated destructive change of the anterior first and second ribs. Additional pleural-based lesion posterior left lower lobe, with left lung volume loss and consolidation and large pleural effusion. Several osseous metastases largest of which is in the left posterior seventh rib. Postsurgical changes right breast.    No Known Allergies     MAR reviewed and pertinent medications noted or modified as needed   Current Facility-Administered Medications   Medication    sodium chloride (NS) flush 5-10 mL    sodium chloride (NS) flush 5-10 mL    sodium chloride (NS) flush 5-10 mL    sodium chloride (NS) flush 5-10 mL    acetaminophen (TYLENOL) tablet 650 mg    oxyCODONE-acetaminophen (PERCOCET) 5-325 mg per tablet 1 Tab    naloxone (NARCAN) injection 0.4 mg    ondansetron (ZOFRAN) injection 4 mg    bisacodyl (DULCOLAX) suppository 10 mg    0.9% sodium chloride infusion    anastrozole (ARIMIDEX) tablet 1 mg    hydrALAZINE (APRESOLINE) 20 mg/mL injection 20 mg    heparin (porcine) injection 5,000 Units      PMH:  has a past medical history of Breast cancer (HCC) and Hypertension. PSH:   has a past surgical history that includes hx gyn; pr breast surgery procedure unlisted; and hx lymphadenectomy. FHX: Family history is unknown by patient. SHX:  reports that she has never smoked. She has never used smokeless tobacco. She reports that she drinks alcohol. She reports that she does not use illicit drugs. ROS:A comprehensive review of systems was negative except for that written in the HPI.     Objective:     Vital Signs: Intake/Output: Intake/Output:   Temp (24hrs), Av.2 °F (36.8 °C), Min:97.9 °F (36.6 °C), Max:98.6 °F (37 °C)     Visit Vitals    /63 (BP 1 Location: Left arm, BP Patient Position: At rest)    Pulse 72    Temp 97.9 °F (36.6 °C)    Resp 18    Ht 5' 3\" (1.6 m)    Wt 76.7 kg (169 lb)    SpO2 98%    BMI 29.94 kg/m2        Telemetry:     O2 Device: Room air  O2 Flow Rate (L/min): 2 l/min  Wt Readings from Last 4 Encounters:   04/05/18 76.7 kg (169 lb)          Intake/Output Summary (Last 24 hours) at 04/06/18 1323  Last data filed at 04/06/18 0400   Gross per 24 hour   Intake             1375 ml   Output                0 ml   Net             1375 ml     Last shift:         Last 3 shifts: 04/04 1901 - 04/06 0700  In: 1593.8 [I.V.:1593.8]  Out: 1120      Physical Exam:    General:   female; alert and oriented times 3;    HEAD: Normocephalic, without obvious abnormality, atraumatic   EYES: conjunctivae clear. PERRL,  AN Icteric sclerae   NOSE: nares normal, no drainage, no nasal flaring,    THROAT: Lips, mucosa dry; No Thrush; class 4 airway; tongue midline   Neck: Supple, symmetrical, trachea midline,  No accessory mm use; No Stridor/ cuff leak, No goiter or thyroid tenderness   LYMPH: No abnormally enlarged lymph nodes. in neck or groin   Chest: normal   Lungs: decreased air exchange posteriorly - left   Heart: Regular rate and rhythm; 1+ bilateral pedal edema,    Abdomen: soft, non-tender, without masses or organomegaly   : ; No Lange    Musculoskeletal: kyphosis; No spine or CVA tenderness;  no joint swelling or erythema   Neuro: alert; speech fluent ; withdraws to pain; normal gait and station; does follow simple commands   Psych: oriented to time, place and person; No agitation;  normal affect   Skin: Pallor;    Pulses:Bilateral, Radial, 2+   Capillary refill: normal; warm,    Data:     Lab results reviewed. For significant abnormal values and values requiring intervention, see assessment and plan.              Labs:    Recent Labs      04/06/18   0356  04/05/18   0151   WBC  7.8  8.4   HGB  8.3*  8.6*   PLT  296  306   INR   --   1.1   APTT   --   27.8     Recent Labs      04/06/18   0356  04/05/18   0233  04/05/18   0151   NA  141   --   139   K  3.3*   --   3.2*   CL  107   --   104   CO2  21   -- 23   GLU  101*   --   136*   BUN  89*   --   95*   CREA  7.73*   --   7.83*   CA  11.5*   --   11.6*   MG   --    --   2.5*   LAC   --   1.4   --    ALB  2.4*   --   2.8*   SGOT  14*   --   16   ALT  15   --   16     No results for input(s): PH, PCO2, PO2, HCO3, FIO2 in the last 72 hours. Recent Labs      04/05/18   0151   CPK  80   CKNDX  7.4*   TROIQ  <0.04     No results found for: BNPP, BNP   Lab Results   Component Value Date/Time    Culture result: NO GROWTH AFTER 19 HOURS 04/05/2018 11:45 AM    Culture result: NO GROWTH 1 DAY 04/05/2018 02:33 AM     Lab Results   Component Value Date/Time    CK 80 04/05/2018 01:51 AM     Lab Results   Component Value Date/Time    Color YELLOW/STRAW 04/05/2018 04:45 AM    Appearance CLOUDY (A) 04/05/2018 04:45 AM    pH (UA) 5.5 04/05/2018 04:45 AM    Protein 100 (A) 04/05/2018 04:45 AM    Glucose NEGATIVE  04/05/2018 04:45 AM    Ketone NEGATIVE  04/05/2018 04:45 AM    Bilirubin NEGATIVE  04/05/2018 04:45 AM    Blood TRACE (A) 04/05/2018 04:45 AM    Urobilinogen 0.2 04/05/2018 04:45 AM    Nitrites NEGATIVE  04/05/2018 04:45 AM    Leukocyte Esterase NEGATIVE  04/05/2018 04:45 AM    WBC 0-4 04/05/2018 04:45 AM    RBC 0-5 04/05/2018 04:45 AM    Bacteria NEGATIVE  04/05/2018 04:45 AM       Imaging:            Results from Hospital Encounter encounter on 04/05/18   XR CHEST PA LAT   Narrative EXAM:  XR CHEST PA LAT    INDICATION:   effusion    COMPARISON: Prior day chest x-ray and chest CT. FINDINGS: PA and lateral radiographs of the chest demonstrate a persistent left  pleural effusion, with some fluid in the left major fissure. The cardiac and  mediastinal contours and pulmonary vascularity are normal. There is a stable  mild compression of the T12 vertebral body. Impression IMPRESSION:     Persistent left pleural effusion.    Mild T12 compression fracture                   Results from [place] encounter on 04/05/18   CT CHEST WO CONT   Narrative INDICATION: assess for left lung mass or effusion    COMPARISON: Chest radiograph earlier today    TECHNIQUE:  Noncontrast 5 mm axial images were obtained through the chest. CT  dose reduction was achieved through use of a standardized protocol tailored for  this examination and automatic exposure control for dose modulation. FINDINGS:    THYROID: Unremarkable. MEDIASTINUM/ISABELL: No mass or lymphadenopathy. Small to moderate hiatal hernia. HEART/PERICARDIUM: Slightly enlarged. Small pericardial fluid. LUNGS/PLEURA: Right basilar atelectasis. Diminished left lung volume with large  pleural effusion. Destructive soft tissue mass anterior left upper lobe  extending into the mediastinum as well as through the ribs in the anterior chest  wall. Mass measures 6.9 x 5.9 x 5.6 cm. Suspect separate pleural mass left  posterior lung base measures 3.0 x 1.8 cm. INCIDENTALLY IMAGED UPPER ABDOMEN: Small ill-defined 1.1 cm splenic hypodensity. BONES: Moderate compression deformity of T12, age indeterminate. Lytic expansile  mass involving the left posterior seventh rib measures 1.7 x 2.9 cm, with  several other subtle lytic lesions involving the bony thorax. ADDITIONAL COMMENTS:  Postsurgical changes partly visualized right breast.   Partly visualized asymmetric density deep left lateral breast.         Impression IMPRESSION:    1. Large soft tissue mass anterior left upper lobe/left mediastinum, with  extension into the deep left breast and associated destructive change of the  anterior first and second ribs. Additional pleural-based lesion posterior left  lower lobe, with left lung volume loss and consolidation and large pleural  effusion. 2. Several osseous metastases largest of which is in the left posterior seventh  rib. 3. Postsurgical changes right breast. Partly visualized asymmetry deep left  breast which may be better evaluated with mammography as clinically indicated. 4. Nonspecific 1.1 cm splenic hypodensity. I have personally and independently reviewed the patients interval and diagnostic data, radiographs and have reviewed the reports. I have ordered additional labs to follow the current medical conditions and to monitor treatment responses over the next 24 hours or sooner if needed. If the pt needs,  additional imaging will be obtained to follow longitudinal changes found on the most current imaging. Thank you for allowing us to participate in the care of this patient. We will be happy to follow with you.     Maya Hodges MD